# Patient Record
Sex: FEMALE | Race: WHITE | Employment: UNEMPLOYED | URBAN - METROPOLITAN AREA
[De-identification: names, ages, dates, MRNs, and addresses within clinical notes are randomized per-mention and may not be internally consistent; named-entity substitution may affect disease eponyms.]

---

## 2019-01-25 ENCOUNTER — APPOINTMENT (OUTPATIENT)
Dept: ULTRASOUND IMAGING | Age: 19
End: 2019-01-25
Payer: COMMERCIAL

## 2019-01-25 ENCOUNTER — HOSPITAL ENCOUNTER (EMERGENCY)
Age: 19
Discharge: HOME | End: 2019-01-25
Attending: EMERGENCY MEDICINE
Payer: COMMERCIAL

## 2019-01-25 VITALS
DIASTOLIC BLOOD PRESSURE: 71 MMHG | WEIGHT: 110 LBS | RESPIRATION RATE: 18 BRPM | OXYGEN SATURATION: 100 % | SYSTOLIC BLOOD PRESSURE: 109 MMHG | BODY MASS INDEX: 18.78 KG/M2 | HEART RATE: 95 BPM | TEMPERATURE: 98.3 F | HEIGHT: 64 IN

## 2019-01-25 DIAGNOSIS — O26.891 ABDOMINAL PAIN DURING PREGNANCY IN FIRST TRIMESTER: Primary | ICD-10-CM

## 2019-01-25 DIAGNOSIS — R10.9 ABDOMINAL PAIN DURING PREGNANCY IN FIRST TRIMESTER: Primary | ICD-10-CM

## 2019-01-25 DIAGNOSIS — R93.89 ABNORMAL ULTRASOUND OF PELVIS: ICD-10-CM

## 2019-01-25 LAB
ALBUMIN SERPL-MCNC: 4.2 GM/DL (ref 3.4–5)
ALP BLD-CCNC: 60 IU/L (ref 40–129)
ALT SERPL-CCNC: 7 U/L (ref 10–40)
AMORPHOUS: ABNORMAL /HPF
ANION GAP SERPL CALCULATED.3IONS-SCNC: 11 MMOL/L (ref 4–16)
AST SERPL-CCNC: 14 IU/L (ref 15–37)
BACTERIA: ABNORMAL /HPF
BASOPHILS ABSOLUTE: 0 K/CU MM
BASOPHILS RELATIVE PERCENT: 0.4 % (ref 0–1)
BILIRUB SERPL-MCNC: 0.3 MG/DL (ref 0–1)
BILIRUBIN URINE: NEGATIVE MG/DL
BLOOD, URINE: NEGATIVE
BUN BLDV-MCNC: 11 MG/DL (ref 6–23)
CALCIUM SERPL-MCNC: 9.1 MG/DL (ref 8.3–10.6)
CHLORIDE BLD-SCNC: 101 MMOL/L (ref 99–110)
CLARITY: ABNORMAL
CO2: 24 MMOL/L (ref 21–32)
COLOR: ABNORMAL
CREAT SERPL-MCNC: 0.8 MG/DL (ref 0.6–1.1)
DIFFERENTIAL TYPE: ABNORMAL
EOSINOPHILS ABSOLUTE: 0.1 K/CU MM
EOSINOPHILS RELATIVE PERCENT: 0.8 % (ref 0–3)
GFR AFRICAN AMERICAN: >60 ML/MIN/1.73M2
GFR NON-AFRICAN AMERICAN: >60 ML/MIN/1.73M2
GLUCOSE BLD-MCNC: 90 MG/DL (ref 70–99)
GLUCOSE, URINE: NEGATIVE MG/DL
GONADOTROPIN, CHORIONIC (HCG) QUANT: 2139 UIU/ML
HCT VFR BLD CALC: 35.7 % (ref 37–47)
HEMOGLOBIN: 11.3 GM/DL (ref 12.5–16)
IMMATURE NEUTROPHIL %: 0.1 % (ref 0–0.43)
KETONES, URINE: ABNORMAL MG/DL
LEUKOCYTE ESTERASE, URINE: NEGATIVE
LYMPHOCYTES ABSOLUTE: 2 K/CU MM
LYMPHOCYTES RELATIVE PERCENT: 27.5 % (ref 24–44)
MCH RBC QN AUTO: 27.7 PG (ref 27–31)
MCHC RBC AUTO-ENTMCNC: 31.7 % (ref 32–36)
MCV RBC AUTO: 87.5 FL (ref 78–100)
MONOCYTES ABSOLUTE: 0.6 K/CU MM
MONOCYTES RELATIVE PERCENT: 7.7 % (ref 0–4)
NITRITE URINE, QUANTITATIVE: NEGATIVE
NUCLEATED RBC %: 0 %
PDW BLD-RTO: 14 % (ref 11.7–14.9)
PH, URINE: 7 (ref 5–8)
PLATELET # BLD: 274 K/CU MM (ref 140–440)
PMV BLD AUTO: 9.8 FL (ref 7.5–11.1)
POTASSIUM SERPL-SCNC: 4.2 MMOL/L (ref 3.5–5.1)
PROTEIN UA: NEGATIVE MG/DL
RBC # BLD: 4.08 M/CU MM (ref 4.2–5.4)
RBC URINE: ABNORMAL /HPF (ref 0–6)
SEGMENTED NEUTROPHILS ABSOLUTE COUNT: 4.5 K/CU MM
SEGMENTED NEUTROPHILS RELATIVE PERCENT: 63.5 % (ref 36–66)
SODIUM BLD-SCNC: 136 MMOL/L (ref 135–145)
SPECIFIC GRAVITY UA: 1.01 (ref 1–1.03)
SPERM: ABNORMAL /HFP
SQUAMOUS EPITHELIAL: 5 /HPF
TOTAL IMMATURE NEUTOROPHIL: 0.01 K/CU MM
TOTAL NUCLEATED RBC: 0 K/CU MM
TOTAL PROTEIN: 7.2 GM/DL (ref 6.4–8.2)
TRANSITIONAL EPITHELIAL: <1 /HPF
TRICHOMONAS: ABNORMAL /HPF
UROBILINOGEN, URINE: NORMAL MG/DL (ref 0.2–1)
WBC # BLD: 7.1 K/CU MM (ref 4–10.5)
WBC UA: 1 /HPF (ref 0–5)

## 2019-01-25 PROCEDURE — 99284 EMERGENCY DEPT VISIT MOD MDM: CPT

## 2019-01-25 PROCEDURE — 36415 COLL VENOUS BLD VENIPUNCTURE: CPT

## 2019-01-25 PROCEDURE — 85025 COMPLETE CBC W/AUTO DIFF WBC: CPT

## 2019-01-25 PROCEDURE — 84702 CHORIONIC GONADOTROPIN TEST: CPT

## 2019-01-25 PROCEDURE — 80053 COMPREHEN METABOLIC PANEL: CPT

## 2019-01-25 PROCEDURE — 81001 URINALYSIS AUTO W/SCOPE: CPT

## 2019-01-25 PROCEDURE — 86901 BLOOD TYPING SEROLOGIC RH(D): CPT

## 2019-01-25 PROCEDURE — 93975 VASCULAR STUDY: CPT

## 2019-01-25 PROCEDURE — 76817 TRANSVAGINAL US OBSTETRIC: CPT

## 2019-01-25 PROCEDURE — 86900 BLOOD TYPING SEROLOGIC ABO: CPT

## 2019-01-25 RX ORDER — ESCITALOPRAM OXALATE 10 MG/1
10 TABLET ORAL DAILY
Status: ON HOLD | COMMUNITY
End: 2019-08-20

## 2019-01-25 ASSESSMENT — PAIN DESCRIPTION - LOCATION: LOCATION: ABDOMEN

## 2019-01-25 ASSESSMENT — PAIN DESCRIPTION - ORIENTATION: ORIENTATION: LOWER

## 2019-01-25 ASSESSMENT — PAIN SCALES - GENERAL: PAINLEVEL_OUTOF10: 1

## 2019-01-25 ASSESSMENT — PAIN DESCRIPTION - PAIN TYPE: TYPE: ACUTE PAIN

## 2019-01-25 NOTE — ED PROVIDER NOTES
eMERGENCY dEPARTMENT eNCOUnter      PCP: Ashley Elliott    CHIEF COMPLAINT    Chief Complaint   Patient presents with    Threatened Miscarriage     6 weeks pregnant and reports passing tissue       HPI    Deven Malone is a 19 y.o.  female who presents At estimated 6 weeks gestation based on last menstrual cycle in the middle of December with passing of what she believes is some tissue last night. This was an isolated issue and she denies any further passing of clots blood. Denies urinary symptoms. She reports baseline abdominal cramping last 3 weeks without any changes in this associated with last night's episode or today. No dizziness presyncope syncope chest pain shortness or other systemic symptoms. Has not yet followed up with OB for this pregnancy but is currently taking prenatals. REVIEW OF SYSTEMS    Constitutional:  Denies fever, chills, weight loss or weakness   HENT:  Denies sore throat or ear pain   Cardiovascular:  Denies chest pain, palpitations   Respiratory:  Denies cough or shortness of breath    GI:  Denies abdominal pain, nausea, vomiting, or diarrhea  :  Denies any urinary symptoms.  See hpi  Musculoskeletal:  Denies back pain,   Skin:  Denies rash  Neurologic:  Denies headache, focal weakness or sensory changes   Endocrine:  Denies polyuria or polydypsia   Lymphatic:  Denies swollen glands     All other review of systems are negative  See HPI and nursing notes for additional information     PAST MEDICAL AND SURGICAL HISTORY    Past Medical History:   Diagnosis Date    Anxiety     Depression      Past Surgical History:   Procedure Laterality Date    SMALL INTESTINE SURGERY         CURRENT MEDICATIONS    Current Outpatient Rx   Medication Sig Dispense Refill    BuPROPion HCl (WELLBUTRIN PO) Take by mouth      escitalopram (LEXAPRO) 10 MG tablet Take 10 mg by mouth daily      Prenatal MV-Min-Fe Fum-FA-DHA (PRENATAL 1 PO) Take by mouth         ALLERGIES    No Known Allergies    SOCIAL AND FAMILY HISTORY    Social History     Social History    Marital status: Single     Spouse name: N/A    Number of children: N/A    Years of education: N/A     Social History Main Topics    Smoking status: Never Smoker    Smokeless tobacco: Never Used    Alcohol use No    Drug use: No    Sexual activity: Not Currently     Other Topics Concern    None     Social History Narrative    None     History reviewed. No pertinent family history. PHYSICAL EXAM    VITAL SIGNS: /71   Pulse 95   Temp 98.3 °F (36.8 °C) (Oral)   Resp 18   Ht 5' 4\" (1.626 m)   Wt 110 lb (49.9 kg)   LMP 03/05/2018   SpO2 100%   BMI 18.88 kg/m²    Constitutional:  Well developed, Well nourished  HENT:  Normocephalic, Atraumatic, PERRL. EOMI. Sclera clear. Conjunctiva normal, No discharge. Neck/Lymphatics: supple, no JVD, no swollen nodes  Cardiovascular:  Normal heart rate, Normal rhythm, No murmurs  Respiratory:  Nonlabored breathing. Normal breath sounds, No wheezing  Abdomen: Bowel sounds normal, Soft, No tenderness, no masses. benign  Musculoskeletal: No edema, No tenderness, No cyanosis  Integument:  Warm, Dry  Neurologic:  Alert & oriented , No focal deficits noted. Cranial nerves II through XII grossly intact.    Psychiatric:  Affect normal, Mood normal.       Labs:  Results for orders placed or performed during the hospital encounter of 01/25/19   CBC Auto Differential   Result Value Ref Range    WBC 7.1 4.0 - 10.5 K/CU MM    RBC 4.08 (L) 4.2 - 5.4 M/CU MM    Hemoglobin 11.3 (L) 12.5 - 16.0 GM/DL    Hematocrit 35.7 (L) 37 - 47 %    MCV 87.5 78 - 100 FL    MCH 27.7 27 - 31 PG    MCHC 31.7 (L) 32.0 - 36.0 %    RDW 14.0 11.7 - 14.9 %    Platelets 453 065 - 716 K/CU MM    MPV 9.8 7.5 - 11.1 FL    Differential Type AUTOMATED DIFFERENTIAL     Segs Relative 63.5 36 - 66 %    Lymphocytes % 27.5 24 - 44 %    Monocytes % 7.7 (H) 0 - 4 %    Eosinophils % 0.8 0 - 3 %    Basophils % 0.4 0 - 1 % endometrium of uncertain etiology.  A very early gestational  sac cannot be excluded. There are 2 right adnexal/ovarian lesions.  A complex lesion measuring up to  2.5 cm may represent a collapsing cyst.  The 2nd lesion appears cystic with  mildly thick wall.  In light of the history of a positive pregnancy test, an  ectopic pregnancy cannot be entirely excluded. Follow-up serial beta hCGs and if clinically indicated, follow-up ultrasound  would be helpful. Narrative:    EXAMINATION:  FIRST TRIMESTER OBSTETRIC ULTRASOUND; DOPPLER EVALUATION OF THE PELVIS    1/25/2019    TECHNIQUE:  Transvaginal first trimester obstetric pelvic ultrasound was performed with  color Doppler flow evaluation.; DOPPLER ULTRASOUND OF THE PELVIS    COMPARISON:  None    HISTORY:  ORDERING SYSTEM PROVIDED HISTORY: passed tissue yesterday in pregnancy  TECHNOLOGIST PROVIDED HISTORY:  Ordering Physician Provided Reason for Exam: passed tissue during pregnancy  Acuity: Acute  Type of Exam: Initial  Additional signs and symptoms: nk  Relevant Medical/Surgical History: nk; ORDERING SYSTEM PROVIDED HISTORY:    FINDINGS:  Uterus: 9.3 cm x 4.8 cm x 4.4 cm    Gestational Sac(s):  There is no evidence of an intrauterine gestational sac. Endometrial stripe measured 13 mm.  There is a 2-3 mm cystic lesion in the  endometrium of uncertain etiology. Right ovary: 3.4 cm x 2.2 cm x 2.4 cm.  There is a complex irregularly shaped  lesion in the right adnexa that is likely of right ovarian origin measuring  2.48 cm x 1.99 cm.  In addition, there is a thick walled cyst measuring 1.5  cm x 1.85 cm.  There was normal Doppler flow in the right ovary. Left ovary: 2.2 cm x 1.5 cm x 1.3 cm with normal Doppler flow. Free fluid:  There is a small amount of free fluid in the cul-de-sac.                    US OB TRANSVAGINAL (Final result)   Result time 01/25/19 19:27:27   [facility] [place] [facility]   Final result by Ira Munoz MD (01/25/19 19:27:27)                Impression:    There is no definite intrauterine gestational sac.  There is a 2-3 mm cystic  lesion in the endometrium of uncertain etiology.  A very early gestational  sac cannot be excluded. There are 2 right adnexal/ovarian lesions.  A complex lesion measuring up to  2.5 cm may represent a collapsing cyst.  The 2nd lesion appears cystic with  mildly thick wall.  In light of the history of a positive pregnancy test, an  ectopic pregnancy cannot be entirely excluded. Follow-up serial beta hCGs and if clinically indicated, follow-up ultrasound  would be helpful. Narrative:    EXAMINATION:  FIRST TRIMESTER OBSTETRIC ULTRASOUND; DOPPLER EVALUATION OF THE PELVIS    1/25/2019    TECHNIQUE:  Transvaginal first trimester obstetric pelvic ultrasound was performed with  color Doppler flow evaluation.; DOPPLER ULTRASOUND OF THE PELVIS    COMPARISON:  None    HISTORY:  ORDERING SYSTEM PROVIDED HISTORY: passed tissue yesterday in pregnancy  TECHNOLOGIST PROVIDED HISTORY:  Ordering Physician Provided Reason for Exam: passed tissue during pregnancy  Acuity: Acute  Type of Exam: Initial  Additional signs and symptoms: nk  Relevant Medical/Surgical History: nk; ORDERING SYSTEM PROVIDED HISTORY:    FINDINGS:  Uterus: 9.3 cm x 4.8 cm x 4.4 cm    Gestational Sac(s):  There is no evidence of an intrauterine gestational sac. Endometrial stripe measured 13 mm.  There is a 2-3 mm cystic lesion in the  endometrium of uncertain etiology. Right ovary: 3.4 cm x 2.2 cm x 2.4 cm.  There is a complex irregularly shaped  lesion in the right adnexa that is likely of right ovarian origin measuring  2.48 cm x 1.99 cm.  In addition, there is a thick walled cyst measuring 1.5  cm x 1.85 cm.  There was normal Doppler flow in the right ovary. Left ovary: 2.2 cm x 1.5 cm x 1.3 cm with normal Doppler flow. Free fluid:  There is a small amount of as well as words and phrases that may be inappropriate. If there are any questions or concerns please feel free to contact the dictating provider for clarification.         Matthew Bess PA-C  01/26/19 3876

## 2019-01-25 NOTE — ED TRIAGE NOTES
Patient to ED with complaints of lower abdominal cramping and vaginal discharge patient reports has \"tissue. \" 6 weeks pregnant. Denies vaginal bleeding. Patient concerned for possible miscarriage.

## 2019-01-25 NOTE — ED NOTES
[place] and type and screen drawn on patient      [facility]  01/25/19 [id number]

## 2019-01-26 NOTE — ED PROVIDER NOTES
Return to the ED for worsening symptoms. All diagnostic, treatment, and disposition decisions were made by myself in conjunction with the advanced practice provider. For all further details of the patient's emergency department visit, please see the advanced practice provider's documentation. Comment: Please note this report has been produced using speech recognition software and may contain errors related to that system including errors in grammar, punctuation, and spelling, as well as words and phrases that may be inappropriate. If there are any questions or concerns please feel free to contact the dictating provider for clarification.         Nathan Correia MD  02/02/19 1381

## 2019-01-27 ENCOUNTER — HOSPITAL ENCOUNTER (EMERGENCY)
Age: 19
Discharge: HOME | End: 2019-01-27
Payer: COMMERCIAL

## 2019-01-27 VITALS
DIASTOLIC BLOOD PRESSURE: 71 MMHG | WEIGHT: 110 LBS | RESPIRATION RATE: 16 BRPM | BODY MASS INDEX: 18.78 KG/M2 | SYSTOLIC BLOOD PRESSURE: 100 MMHG | TEMPERATURE: 98.7 F | OXYGEN SATURATION: 99 % | HEIGHT: 64 IN | HEART RATE: 76 BPM

## 2019-01-27 DIAGNOSIS — Z34.91 FIRST TRIMESTER PREGNANCY: Primary | ICD-10-CM

## 2019-01-27 LAB — GONADOTROPIN, CHORIONIC (HCG) QUANT: 4284 UIU/ML

## 2019-01-27 PROCEDURE — 84702 CHORIONIC GONADOTROPIN TEST: CPT

## 2019-01-27 PROCEDURE — 36415 COLL VENOUS BLD VENIPUNCTURE: CPT

## 2019-01-27 PROCEDURE — 99282 EMERGENCY DEPT VISIT SF MDM: CPT

## 2019-01-27 ASSESSMENT — PAIN DESCRIPTION - PAIN TYPE: TYPE: ACUTE PAIN

## 2019-01-27 ASSESSMENT — PAIN SCALES - GENERAL: PAINLEVEL_OUTOF10: 6

## 2019-01-27 ASSESSMENT — PAIN DESCRIPTION - LOCATION: LOCATION: ABDOMEN

## 2019-02-27 ENCOUNTER — HOSPITAL ENCOUNTER (EMERGENCY)
Age: 19
Discharge: HOME | End: 2019-02-27
Payer: COMMERCIAL

## 2019-02-27 VITALS
RESPIRATION RATE: 16 BRPM | SYSTOLIC BLOOD PRESSURE: 115 MMHG | TEMPERATURE: 98.2 F | BODY MASS INDEX: 18.78 KG/M2 | OXYGEN SATURATION: 99 % | HEIGHT: 64 IN | WEIGHT: 110 LBS | HEART RATE: 98 BPM | DIASTOLIC BLOOD PRESSURE: 80 MMHG

## 2019-02-27 DIAGNOSIS — R11.2 NON-INTRACTABLE VOMITING WITH NAUSEA, UNSPECIFIED VOMITING TYPE: Primary | ICD-10-CM

## 2019-02-27 LAB
BACTERIA: NEGATIVE /HPF
BILIRUBIN URINE: NEGATIVE MG/DL
BLOOD, URINE: ABNORMAL
CLARITY: CLEAR
COLOR: YELLOW
GLUCOSE, URINE: NEGATIVE MG/DL
GONADOTROPIN, CHORIONIC (HCG) QUANT: NORMAL UIU/ML
KETONES, URINE: ABNORMAL MG/DL
LEUKOCYTE ESTERASE, URINE: NEGATIVE
MUCUS: ABNORMAL HPF
NITRITE URINE, QUANTITATIVE: NEGATIVE
PH, URINE: 5 (ref 5–8)
PROTEIN UA: 30 MG/DL
RAPID INFLUENZA  B AGN: NEGATIVE
RAPID INFLUENZA A AGN: NEGATIVE
RBC URINE: 10 /HPF (ref 0–6)
SPECIFIC GRAVITY UA: 1.03 (ref 1–1.03)
SQUAMOUS EPITHELIAL: 14 /HPF
TRICHOMONAS: ABNORMAL /HPF
UROBILINOGEN, URINE: 2 MG/DL (ref 0.2–1)
WBC UA: 2 /HPF (ref 0–5)

## 2019-02-27 PROCEDURE — 87804 INFLUENZA ASSAY W/OPTIC: CPT

## 2019-02-27 PROCEDURE — 84702 CHORIONIC GONADOTROPIN TEST: CPT

## 2019-02-27 PROCEDURE — 2580000003 HC RX 258: Performed by: NURSE PRACTITIONER

## 2019-02-27 PROCEDURE — 6360000002 HC RX W HCPCS: Performed by: NURSE PRACTITIONER

## 2019-02-27 PROCEDURE — 99283 EMERGENCY DEPT VISIT LOW MDM: CPT

## 2019-02-27 PROCEDURE — 81001 URINALYSIS AUTO W/SCOPE: CPT

## 2019-02-27 PROCEDURE — 96374 THER/PROPH/DIAG INJ IV PUSH: CPT

## 2019-02-27 RX ORDER — METOCLOPRAMIDE HYDROCHLORIDE 5 MG/ML
10 INJECTION INTRAMUSCULAR; INTRAVENOUS ONCE
Status: COMPLETED | OUTPATIENT
Start: 2019-02-27 | End: 2019-02-27

## 2019-02-27 RX ORDER — 0.9 % SODIUM CHLORIDE 0.9 %
1000 INTRAVENOUS SOLUTION INTRAVENOUS ONCE
Status: COMPLETED | OUTPATIENT
Start: 2019-02-27 | End: 2019-02-27

## 2019-02-27 RX ADMIN — SODIUM CHLORIDE 1000 ML: 9 INJECTION, SOLUTION INTRAVENOUS at 16:23

## 2019-02-27 RX ADMIN — METOCLOPRAMIDE 10 MG: 5 INJECTION, SOLUTION INTRAMUSCULAR; INTRAVENOUS at 16:23

## 2019-02-27 ASSESSMENT — PAIN DESCRIPTION - LOCATION: LOCATION: ABDOMEN

## 2019-02-27 ASSESSMENT — PAIN DESCRIPTION - PAIN TYPE: TYPE: ACUTE PAIN

## 2019-02-27 ASSESSMENT — PAIN DESCRIPTION - DESCRIPTORS: DESCRIPTORS: CRAMPING

## 2019-02-27 ASSESSMENT — PAIN SCALES - GENERAL: PAINLEVEL_OUTOF10: 4

## 2019-03-27 LAB
ABO, EXTERNAL RESULT: NORMAL
C. TRACHOMATIS, EXTERNAL RESULT: NEGATIVE
HEP B, EXTERNAL RESULT: NEGATIVE
HIV, EXTERNAL RESULT: NON REACTIVE
N. GONORRHOEAE, EXTERNAL RESULT: NEGATIVE
RH FACTOR, EXTERNAL RESULT: POSITIVE
RPR, EXTERNAL RESULT: NON REACTIVE
RUBELLA TITER, EXTERNAL RESULT: NORMAL

## 2019-08-20 ENCOUNTER — HOSPITAL ENCOUNTER (OUTPATIENT)
Age: 19
Discharge: HOME | End: 2019-08-20
Attending: OBSTETRICS & GYNECOLOGY | Admitting: OBSTETRICS & GYNECOLOGY
Payer: COMMERCIAL

## 2019-08-20 VITALS
WEIGHT: 135 LBS | SYSTOLIC BLOOD PRESSURE: 110 MMHG | DIASTOLIC BLOOD PRESSURE: 66 MMHG | HEART RATE: 85 BPM | HEIGHT: 64 IN | BODY MASS INDEX: 23.05 KG/M2 | TEMPERATURE: 98.3 F | RESPIRATION RATE: 16 BRPM

## 2019-08-20 PROBLEM — O26.90 PREGNANCY RELATED CONDITION: Status: ACTIVE | Noted: 2019-08-20

## 2019-08-20 LAB
BACTERIA: ABNORMAL /HPF
BILIRUBIN URINE: NEGATIVE MG/DL
BLOOD, URINE: NEGATIVE
CLARITY: ABNORMAL
COLOR: YELLOW
GLUCOSE, URINE: NEGATIVE MG/DL
KETONES, URINE: ABNORMAL MG/DL
LEUKOCYTE ESTERASE, URINE: ABNORMAL
MUCUS: ABNORMAL HPF
NITRITE URINE, QUANTITATIVE: NEGATIVE
PH, URINE: 7 (ref 5–8)
PROTEIN UA: 30 MG/DL
RBC URINE: 1 /HPF (ref 0–6)
SPECIFIC GRAVITY UA: 1.01 (ref 1–1.03)
SQUAMOUS EPITHELIAL: 57 /HPF
UROBILINOGEN, URINE: NORMAL MG/DL (ref 0.2–1)
WBC UA: 6 /HPF (ref 0–5)

## 2019-08-20 PROCEDURE — 96361 HYDRATE IV INFUSION ADD-ON: CPT

## 2019-08-20 PROCEDURE — 96374 THER/PROPH/DIAG INJ IV PUSH: CPT

## 2019-08-20 PROCEDURE — 2580000003 HC RX 258: Performed by: OBSTETRICS & GYNECOLOGY

## 2019-08-20 PROCEDURE — 96360 HYDRATION IV INFUSION INIT: CPT

## 2019-08-20 PROCEDURE — 6360000002 HC RX W HCPCS

## 2019-08-20 PROCEDURE — 81001 URINALYSIS AUTO W/SCOPE: CPT

## 2019-08-20 PROCEDURE — 2580000003 HC RX 258

## 2019-08-20 PROCEDURE — 99211 OFF/OP EST MAY X REQ PHY/QHP: CPT

## 2019-08-20 RX ORDER — SODIUM CHLORIDE, SODIUM LACTATE, POTASSIUM CHLORIDE, CALCIUM CHLORIDE 600; 310; 30; 20 MG/100ML; MG/100ML; MG/100ML; MG/100ML
1000 INJECTION, SOLUTION INTRAVENOUS CONTINUOUS
Status: DISCONTINUED | OUTPATIENT
Start: 2019-08-20 | End: 2019-08-20 | Stop reason: HOSPADM

## 2019-08-20 RX ORDER — ONDANSETRON 2 MG/ML
4 INJECTION INTRAMUSCULAR; INTRAVENOUS EVERY 6 HOURS PRN
Status: DISCONTINUED | OUTPATIENT
Start: 2019-08-20 | End: 2019-08-20 | Stop reason: HOSPADM

## 2019-08-20 RX ORDER — SODIUM CHLORIDE, SODIUM LACTATE, POTASSIUM CHLORIDE, CALCIUM CHLORIDE 600; 310; 30; 20 MG/100ML; MG/100ML; MG/100ML; MG/100ML
INJECTION, SOLUTION INTRAVENOUS
Status: COMPLETED
Start: 2019-08-20 | End: 2019-08-20

## 2019-08-20 RX ORDER — ONDANSETRON 2 MG/ML
INJECTION INTRAMUSCULAR; INTRAVENOUS
Status: COMPLETED
Start: 2019-08-20 | End: 2019-08-20

## 2019-08-20 RX ORDER — SODIUM CHLORIDE, SODIUM LACTATE, POTASSIUM CHLORIDE, CALCIUM CHLORIDE 600; 310; 30; 20 MG/100ML; MG/100ML; MG/100ML; MG/100ML
INJECTION, SOLUTION INTRAVENOUS ONCE
Status: COMPLETED | OUTPATIENT
Start: 2019-08-20 | End: 2019-08-20

## 2019-08-20 RX ORDER — SERTRALINE HYDROCHLORIDE 25 MG/1
25 TABLET, FILM COATED ORAL DAILY
COMMUNITY

## 2019-08-20 RX ADMIN — ONDANSETRON 4 MG: 2 INJECTION INTRAMUSCULAR; INTRAVENOUS at 12:09

## 2019-08-20 RX ADMIN — SODIUM CHLORIDE, POTASSIUM CHLORIDE, SODIUM LACTATE AND CALCIUM CHLORIDE 125 ML: 600; 310; 30; 20 INJECTION, SOLUTION INTRAVENOUS at 12:53

## 2019-08-20 RX ADMIN — SODIUM CHLORIDE, SODIUM LACTATE, POTASSIUM CHLORIDE, CALCIUM CHLORIDE 125 ML: 600; 310; 30; 20 INJECTION, SOLUTION INTRAVENOUS at 12:53

## 2019-08-20 RX ADMIN — SODIUM CHLORIDE, POTASSIUM CHLORIDE, SODIUM LACTATE AND CALCIUM CHLORIDE: 600; 310; 30; 20 INJECTION, SOLUTION INTRAVENOUS at 12:08

## 2019-08-27 LAB — GBS, EXTERNAL RESULT: NEGATIVE

## 2019-10-01 ENCOUNTER — HOSPITAL ENCOUNTER (INPATIENT)
Age: 19
LOS: 3 days | Discharge: HOME | End: 2019-10-04
Attending: OBSTETRICS & GYNECOLOGY | Admitting: OBSTETRICS & GYNECOLOGY
Payer: COMMERCIAL

## 2019-10-01 DIAGNOSIS — G89.18 POST-OP PAIN: Primary | ICD-10-CM

## 2019-10-01 PROBLEM — Z34.93 ENCOUNTER FOR PREGNANCY RELATED EXAMINATION IN THIRD TRIMESTER: Status: ACTIVE | Noted: 2019-10-01

## 2019-10-01 LAB
ABO/RH: NORMAL
AMPHETAMINES: NEGATIVE
ANTIBODY SCREEN: NEGATIVE
BARBITURATE SCREEN URINE: NEGATIVE
BENZODIAZEPINE SCREEN, URINE: NEGATIVE
CANNABINOID SCREEN URINE: NEGATIVE
COCAINE METABOLITE: NEGATIVE
HCT VFR BLD CALC: 35.6 % (ref 37–47)
HEMOGLOBIN: 10.8 GM/DL (ref 12.5–16)
MCH RBC QN AUTO: 26.7 PG (ref 27–31)
MCHC RBC AUTO-ENTMCNC: 30.3 % (ref 32–36)
MCV RBC AUTO: 88.1 FL (ref 78–100)
OPIATES, URINE: NEGATIVE
OXYCODONE: NORMAL
PDW BLD-RTO: 14.3 % (ref 11.7–14.9)
PHENCYCLIDINE, URINE: NEGATIVE
PLATELET # BLD: 274 K/CU MM (ref 140–440)
PMV BLD AUTO: 10.4 FL (ref 7.5–11.1)
RBC # BLD: 4.04 M/CU MM (ref 4.2–5.4)
WBC # BLD: 9.1 K/CU MM (ref 4–10.5)

## 2019-10-01 PROCEDURE — 1220000000 HC SEMI PRIVATE OB R&B

## 2019-10-01 PROCEDURE — 6360000002 HC RX W HCPCS: Performed by: OBSTETRICS & GYNECOLOGY

## 2019-10-01 PROCEDURE — 2580000003 HC RX 258: Performed by: OBSTETRICS & GYNECOLOGY

## 2019-10-01 PROCEDURE — 3E0P7VZ INTRODUCTION OF HORMONE INTO FEMALE REPRODUCTIVE, VIA NATURAL OR ARTIFICIAL OPENING: ICD-10-PCS | Performed by: OBSTETRICS & GYNECOLOGY

## 2019-10-01 PROCEDURE — 86901 BLOOD TYPING SEROLOGIC RH(D): CPT

## 2019-10-01 PROCEDURE — 86900 BLOOD TYPING SEROLOGIC ABO: CPT

## 2019-10-01 PROCEDURE — 86850 RBC ANTIBODY SCREEN: CPT

## 2019-10-01 PROCEDURE — 6370000000 HC RX 637 (ALT 250 FOR IP): Performed by: OBSTETRICS & GYNECOLOGY

## 2019-10-01 PROCEDURE — 80307 DRUG TEST PRSMV CHEM ANLYZR: CPT

## 2019-10-01 PROCEDURE — 85027 COMPLETE CBC AUTOMATED: CPT

## 2019-10-01 PROCEDURE — 3E033VJ INTRODUCTION OF OTHER HORMONE INTO PERIPHERAL VEIN, PERCUTANEOUS APPROACH: ICD-10-PCS | Performed by: OBSTETRICS & GYNECOLOGY

## 2019-10-01 RX ORDER — FENTANYL CITRATE 50 UG/ML
100 INJECTION, SOLUTION INTRAMUSCULAR; INTRAVENOUS
Status: DISCONTINUED | OUTPATIENT
Start: 2019-10-01 | End: 2019-10-02

## 2019-10-01 RX ORDER — DIPHENHYDRAMINE HCL 50 MG
50 CAPSULE ORAL NIGHTLY PRN
Status: DISCONTINUED | OUTPATIENT
Start: 2019-10-01 | End: 2019-10-03

## 2019-10-01 RX ORDER — SODIUM CHLORIDE, SODIUM LACTATE, POTASSIUM CHLORIDE, CALCIUM CHLORIDE 600; 310; 30; 20 MG/100ML; MG/100ML; MG/100ML; MG/100ML
INJECTION, SOLUTION INTRAVENOUS CONTINUOUS
Status: DISCONTINUED | OUTPATIENT
Start: 2019-10-01 | End: 2019-10-03

## 2019-10-01 RX ORDER — ONDANSETRON 2 MG/ML
4 INJECTION INTRAMUSCULAR; INTRAVENOUS EVERY 6 HOURS PRN
Status: DISCONTINUED | OUTPATIENT
Start: 2019-10-01 | End: 2019-10-02 | Stop reason: HOSPADM

## 2019-10-01 RX ADMIN — DINOPROSTONE 10 MG: 10 INSERT VAGINAL at 20:17

## 2019-10-01 RX ADMIN — DIPHENHYDRAMINE HYDROCHLORIDE 50 MG: 50 CAPSULE ORAL at 21:02

## 2019-10-01 RX ADMIN — Medication 1 MILLI-UNITS/MIN: at 08:48

## 2019-10-01 RX ADMIN — SODIUM CHLORIDE, POTASSIUM CHLORIDE, SODIUM LACTATE AND CALCIUM CHLORIDE: 600; 310; 30; 20 INJECTION, SOLUTION INTRAVENOUS at 16:27

## 2019-10-01 RX ADMIN — SODIUM CHLORIDE, POTASSIUM CHLORIDE, SODIUM LACTATE AND CALCIUM CHLORIDE: 600; 310; 30; 20 INJECTION, SOLUTION INTRAVENOUS at 08:34

## 2019-10-01 ASSESSMENT — PAIN SCALES - GENERAL
PAINLEVEL_OUTOF10: 0
PAINLEVEL_OUTOF10: 1
PAINLEVEL_OUTOF10: 1
PAINLEVEL_OUTOF10: 0

## 2019-10-01 ASSESSMENT — PAIN DESCRIPTION - PAIN TYPE: TYPE: ACUTE PAIN

## 2019-10-01 ASSESSMENT — PAIN - FUNCTIONAL ASSESSMENT
PAIN_FUNCTIONAL_ASSESSMENT: ACTIVITIES ARE NOT PREVENTED
PAIN_FUNCTIONAL_ASSESSMENT: ACTIVITIES ARE NOT PREVENTED

## 2019-10-01 ASSESSMENT — PAIN DESCRIPTION - FREQUENCY: FREQUENCY: INTERMITTENT

## 2019-10-01 ASSESSMENT — PAIN DESCRIPTION - DESCRIPTORS: DESCRIPTORS: CRAMPING

## 2019-10-01 ASSESSMENT — PAIN DESCRIPTION - LOCATION: LOCATION: ABDOMEN

## 2019-10-01 ASSESSMENT — PAIN DESCRIPTION - ORIENTATION: ORIENTATION: LOWER

## 2019-10-02 ENCOUNTER — ANESTHESIA (OUTPATIENT)
Dept: LABOR AND DELIVERY | Age: 19
End: 2019-10-02
Payer: COMMERCIAL

## 2019-10-02 ENCOUNTER — ANESTHESIA EVENT (OUTPATIENT)
Dept: LABOR AND DELIVERY | Age: 19
End: 2019-10-02
Payer: COMMERCIAL

## 2019-10-02 PROCEDURE — 7200000001 HC VAGINAL DELIVERY

## 2019-10-02 PROCEDURE — 2580000003 HC RX 258: Performed by: OBSTETRICS & GYNECOLOGY

## 2019-10-02 PROCEDURE — 6360000002 HC RX W HCPCS: Performed by: OBSTETRICS & GYNECOLOGY

## 2019-10-02 PROCEDURE — 0KQM0ZZ REPAIR PERINEUM MUSCLE, OPEN APPROACH: ICD-10-PCS | Performed by: OBSTETRICS & GYNECOLOGY

## 2019-10-02 PROCEDURE — 6360000002 HC RX W HCPCS: Performed by: ANESTHESIOLOGY

## 2019-10-02 PROCEDURE — 3700000025 EPIDURAL BLOCK: Performed by: ANESTHESIOLOGY

## 2019-10-02 PROCEDURE — 51702 INSERT TEMP BLADDER CATH: CPT

## 2019-10-02 PROCEDURE — 6360000002 HC RX W HCPCS: Performed by: NURSE ANESTHETIST, CERTIFIED REGISTERED

## 2019-10-02 PROCEDURE — 1220000000 HC SEMI PRIVATE OB R&B

## 2019-10-02 RX ORDER — ROPIVACAINE HYDROCHLORIDE 2 MG/ML
14 INJECTION, SOLUTION EPIDURAL; INFILTRATION; PERINEURAL CONTINUOUS
Status: DISCONTINUED | OUTPATIENT
Start: 2019-10-02 | End: 2019-10-03

## 2019-10-02 RX ORDER — IBUPROFEN 800 MG/1
800 TABLET ORAL EVERY 8 HOURS PRN
Status: DISCONTINUED | OUTPATIENT
Start: 2019-10-02 | End: 2019-10-04 | Stop reason: HOSPADM

## 2019-10-02 RX ORDER — ACETAMINOPHEN 325 MG/1
650 TABLET ORAL EVERY 4 HOURS PRN
Status: DISCONTINUED | OUTPATIENT
Start: 2019-10-02 | End: 2019-10-04 | Stop reason: HOSPADM

## 2019-10-02 RX ORDER — HYDROCODONE BITARTRATE AND ACETAMINOPHEN 5; 325 MG/1; MG/1
2 TABLET ORAL EVERY 4 HOURS PRN
Status: DISCONTINUED | OUTPATIENT
Start: 2019-10-02 | End: 2019-10-04 | Stop reason: HOSPADM

## 2019-10-02 RX ORDER — ROPIVACAINE HYDROCHLORIDE 2 MG/ML
INJECTION, SOLUTION EPIDURAL; INFILTRATION; PERINEURAL PRN
Status: DISCONTINUED | OUTPATIENT
Start: 2019-10-02 | End: 2019-10-02 | Stop reason: SDUPTHER

## 2019-10-02 RX ORDER — HYDROCODONE BITARTRATE AND ACETAMINOPHEN 5; 325 MG/1; MG/1
1 TABLET ORAL EVERY 4 HOURS PRN
Status: DISCONTINUED | OUTPATIENT
Start: 2019-10-02 | End: 2019-10-04 | Stop reason: HOSPADM

## 2019-10-02 RX ADMIN — SODIUM CHLORIDE, POTASSIUM CHLORIDE, SODIUM LACTATE AND CALCIUM CHLORIDE: 600; 310; 30; 20 INJECTION, SOLUTION INTRAVENOUS at 16:03

## 2019-10-02 RX ADMIN — FENTANYL CITRATE 100 MCG: 50 INJECTION INTRAMUSCULAR; INTRAVENOUS at 14:37

## 2019-10-02 RX ADMIN — Medication 250 MILLI-UNITS/MIN: at 19:39

## 2019-10-02 RX ADMIN — SODIUM CHLORIDE, POTASSIUM CHLORIDE, SODIUM LACTATE AND CALCIUM CHLORIDE: 600; 310; 30; 20 INJECTION, SOLUTION INTRAVENOUS at 02:14

## 2019-10-02 RX ADMIN — ROPIVACAINE HYDROCHLORIDE 14 ML/HR: 2 INJECTION, SOLUTION EPIDURAL; INFILTRATION at 15:05

## 2019-10-02 RX ADMIN — FENTANYL CITRATE 100 MCG: 50 INJECTION INTRAMUSCULAR; INTRAVENOUS at 13:21

## 2019-10-02 RX ADMIN — Medication 1 MILLI-UNITS/MIN: at 10:40

## 2019-10-02 RX ADMIN — ROPIVACAINE HYDROCHLORIDE 10 ML: 2 INJECTION, SOLUTION EPIDURAL; INFILTRATION at 15:05

## 2019-10-02 RX ADMIN — ONDANSETRON 4 MG: 2 INJECTION INTRAMUSCULAR; INTRAVENOUS at 13:26

## 2019-10-02 RX ADMIN — SODIUM CHLORIDE, POTASSIUM CHLORIDE, SODIUM LACTATE AND CALCIUM CHLORIDE: 600; 310; 30; 20 INJECTION, SOLUTION INTRAVENOUS at 12:03

## 2019-10-02 ASSESSMENT — PAIN SCALES - GENERAL
PAINLEVEL_OUTOF10: 2
PAINLEVEL_OUTOF10: 0
PAINLEVEL_OUTOF10: 7
PAINLEVEL_OUTOF10: 0
PAINLEVEL_OUTOF10: 0
PAINLEVEL_OUTOF10: 5
PAINLEVEL_OUTOF10: 3
PAINLEVEL_OUTOF10: 0

## 2019-10-02 ASSESSMENT — PAIN DESCRIPTION - PROGRESSION
CLINICAL_PROGRESSION: NOT CHANGED
CLINICAL_PROGRESSION: GRADUALLY WORSENING
CLINICAL_PROGRESSION: NOT CHANGED
CLINICAL_PROGRESSION: GRADUALLY WORSENING
CLINICAL_PROGRESSION: NOT CHANGED

## 2019-10-02 ASSESSMENT — PAIN DESCRIPTION - ORIENTATION
ORIENTATION: MID;LOWER
ORIENTATION: MID;LOWER

## 2019-10-02 ASSESSMENT — PAIN DESCRIPTION - PAIN TYPE
TYPE: ACUTE PAIN
TYPE: ACUTE PAIN

## 2019-10-02 ASSESSMENT — PAIN DESCRIPTION - LOCATION
LOCATION: PELVIS
LOCATION: PELVIS

## 2019-10-02 ASSESSMENT — PAIN DESCRIPTION - DESCRIPTORS
DESCRIPTORS: CRAMPING

## 2019-10-02 ASSESSMENT — PAIN DESCRIPTION - ONSET
ONSET: ON-GOING
ONSET: ON-GOING

## 2019-10-02 ASSESSMENT — PAIN DESCRIPTION - FREQUENCY
FREQUENCY: INTERMITTENT
FREQUENCY: INTERMITTENT

## 2019-10-03 PROCEDURE — 1220000000 HC SEMI PRIVATE OB R&B

## 2019-10-03 PROCEDURE — 6370000000 HC RX 637 (ALT 250 FOR IP): Performed by: OBSTETRICS & GYNECOLOGY

## 2019-10-03 RX ORDER — SODIUM CHLORIDE 0.9 % (FLUSH) 0.9 %
10 SYRINGE (ML) INJECTION EVERY 12 HOURS SCHEDULED
Status: DISCONTINUED | OUTPATIENT
Start: 2019-10-03 | End: 2019-10-04 | Stop reason: HOSPADM

## 2019-10-03 RX ORDER — SERTRALINE HYDROCHLORIDE 25 MG/1
25 TABLET, FILM COATED ORAL DAILY
Status: DISCONTINUED | OUTPATIENT
Start: 2019-10-03 | End: 2019-10-04 | Stop reason: HOSPADM

## 2019-10-03 RX ORDER — FERROUS SULFATE 325(65) MG
325 TABLET ORAL 2 TIMES DAILY WITH MEALS
Status: DISCONTINUED | OUTPATIENT
Start: 2019-10-03 | End: 2019-10-04 | Stop reason: HOSPADM

## 2019-10-03 RX ORDER — DOCUSATE SODIUM 100 MG/1
100 CAPSULE, LIQUID FILLED ORAL 2 TIMES DAILY
Status: DISCONTINUED | OUTPATIENT
Start: 2019-10-03 | End: 2019-10-04 | Stop reason: HOSPADM

## 2019-10-03 RX ORDER — METHYLERGONOVINE MALEATE 0.2 MG/ML
200 INJECTION INTRAVENOUS PRN
Status: DISCONTINUED | OUTPATIENT
Start: 2019-10-03 | End: 2019-10-04 | Stop reason: HOSPADM

## 2019-10-03 RX ORDER — LANOLIN 100 %
OINTMENT (GRAM) TOPICAL PRN
Status: DISCONTINUED | OUTPATIENT
Start: 2019-10-03 | End: 2019-10-04 | Stop reason: HOSPADM

## 2019-10-03 RX ORDER — SIMETHICONE 80 MG
80 TABLET,CHEWABLE ORAL EVERY 6 HOURS PRN
Status: DISCONTINUED | OUTPATIENT
Start: 2019-10-03 | End: 2019-10-04 | Stop reason: HOSPADM

## 2019-10-03 RX ORDER — ONDANSETRON 4 MG/1
8 TABLET, ORALLY DISINTEGRATING ORAL EVERY 8 HOURS PRN
Status: DISCONTINUED | OUTPATIENT
Start: 2019-10-03 | End: 2019-10-04 | Stop reason: HOSPADM

## 2019-10-03 RX ORDER — SODIUM CHLORIDE 0.9 % (FLUSH) 0.9 %
10 SYRINGE (ML) INJECTION PRN
Status: DISCONTINUED | OUTPATIENT
Start: 2019-10-03 | End: 2019-10-04 | Stop reason: HOSPADM

## 2019-10-03 RX ADMIN — IBUPROFEN 800 MG: 800 TABLET, FILM COATED ORAL at 09:37

## 2019-10-03 RX ADMIN — DOCUSATE SODIUM 100 MG: 100 CAPSULE, LIQUID FILLED ORAL at 21:03

## 2019-10-03 RX ADMIN — HYDROCODONE BITARTRATE AND ACETAMINOPHEN 2 TABLET: 5; 325 TABLET ORAL at 21:03

## 2019-10-03 RX ADMIN — HYDROCODONE BITARTRATE AND ACETAMINOPHEN 2 TABLET: 5; 325 TABLET ORAL at 16:02

## 2019-10-03 RX ADMIN — HYDROCODONE BITARTRATE AND ACETAMINOPHEN 1 TABLET: 5; 325 TABLET ORAL at 02:48

## 2019-10-03 RX ADMIN — DOCUSATE SODIUM 100 MG: 100 CAPSULE, LIQUID FILLED ORAL at 09:37

## 2019-10-03 RX ADMIN — HYDROCODONE BITARTRATE AND ACETAMINOPHEN 1 TABLET: 5; 325 TABLET ORAL at 11:41

## 2019-10-03 RX ADMIN — IBUPROFEN 800 MG: 800 TABLET, FILM COATED ORAL at 18:26

## 2019-10-03 ASSESSMENT — PAIN SCALES - GENERAL
PAINLEVEL_OUTOF10: 7
PAINLEVEL_OUTOF10: 7
PAINLEVEL_OUTOF10: 4
PAINLEVEL_OUTOF10: 3
PAINLEVEL_OUTOF10: 3
PAINLEVEL_OUTOF10: 0
PAINLEVEL_OUTOF10: 4

## 2019-10-04 VITALS
OXYGEN SATURATION: 100 % | TEMPERATURE: 98.4 F | WEIGHT: 140 LBS | BODY MASS INDEX: 23.9 KG/M2 | SYSTOLIC BLOOD PRESSURE: 104 MMHG | RESPIRATION RATE: 18 BRPM | HEIGHT: 64 IN | HEART RATE: 67 BPM | DIASTOLIC BLOOD PRESSURE: 66 MMHG

## 2019-10-04 PROCEDURE — 6370000000 HC RX 637 (ALT 250 FOR IP): Performed by: OBSTETRICS & GYNECOLOGY

## 2019-10-04 RX ORDER — IBUPROFEN 800 MG/1
800 TABLET ORAL EVERY 8 HOURS PRN
Qty: 120 TABLET | Refills: 3 | Status: SHIPPED | OUTPATIENT
Start: 2019-10-04

## 2019-10-04 RX ORDER — HYDROCODONE BITARTRATE AND ACETAMINOPHEN 5; 325 MG/1; MG/1
1 TABLET ORAL EVERY 6 HOURS PRN
Qty: 8 TABLET | Refills: 0 | Status: SHIPPED | OUTPATIENT
Start: 2019-10-04 | End: 2019-10-09

## 2019-10-04 RX ADMIN — IBUPROFEN 800 MG: 800 TABLET, FILM COATED ORAL at 10:01

## 2019-10-04 RX ADMIN — HYDROCODONE BITARTRATE AND ACETAMINOPHEN 1 TABLET: 5; 325 TABLET ORAL at 12:23

## 2019-10-04 RX ADMIN — DOCUSATE SODIUM 100 MG: 100 CAPSULE, LIQUID FILLED ORAL at 10:02

## 2019-10-04 RX ADMIN — HYDROCODONE BITARTRATE AND ACETAMINOPHEN 2 TABLET: 5; 325 TABLET ORAL at 06:38

## 2019-10-04 ASSESSMENT — PAIN DESCRIPTION - PROGRESSION
CLINICAL_PROGRESSION: GRADUALLY WORSENING
CLINICAL_PROGRESSION: GRADUALLY IMPROVING
CLINICAL_PROGRESSION: GRADUALLY WORSENING

## 2019-10-04 ASSESSMENT — PAIN SCALES - GENERAL
PAINLEVEL_OUTOF10: 7
PAINLEVEL_OUTOF10: 5
PAINLEVEL_OUTOF10: 3
PAINLEVEL_OUTOF10: 2

## 2019-10-04 ASSESSMENT — PAIN - FUNCTIONAL ASSESSMENT: PAIN_FUNCTIONAL_ASSESSMENT: ACTIVITIES ARE NOT PREVENTED

## 2019-10-04 ASSESSMENT — PAIN DESCRIPTION - FREQUENCY: FREQUENCY: CONTINUOUS

## 2019-10-04 ASSESSMENT — PAIN DESCRIPTION - ONSET: ONSET: GRADUAL

## 2019-10-04 ASSESSMENT — PAIN DESCRIPTION - DESCRIPTORS: DESCRIPTORS: DISCOMFORT;SORE

## 2019-10-04 ASSESSMENT — PAIN DESCRIPTION - LOCATION: LOCATION: PERINEUM;VAGINA

## 2019-10-04 ASSESSMENT — PAIN DESCRIPTION - PAIN TYPE: TYPE: ACUTE PAIN

## 2021-06-08 ENCOUNTER — HOSPITAL ENCOUNTER (EMERGENCY)
Age: 21
Discharge: HOME | End: 2021-06-08
Attending: EMERGENCY MEDICINE
Payer: COMMERCIAL

## 2021-06-08 VITALS
OXYGEN SATURATION: 100 % | SYSTOLIC BLOOD PRESSURE: 120 MMHG | BODY MASS INDEX: 20.94 KG/M2 | RESPIRATION RATE: 16 BRPM | TEMPERATURE: 99 F | HEART RATE: 82 BPM | WEIGHT: 122 LBS | DIASTOLIC BLOOD PRESSURE: 80 MMHG

## 2021-06-08 DIAGNOSIS — O21.0 MILD HYPEREMESIS GRAVIDARUM, ANTEPARTUM: Primary | ICD-10-CM

## 2021-06-08 LAB
ANION GAP SERPL CALCULATED.3IONS-SCNC: 3 MMOL/L (ref 4–16)
BUN BLDV-MCNC: 8 MG/DL (ref 6–23)
CALCIUM SERPL-MCNC: 9.4 MG/DL (ref 8.3–10.6)
CHLORIDE BLD-SCNC: 102 MMOL/L (ref 99–110)
CO2: 30 MMOL/L (ref 21–32)
CREAT SERPL-MCNC: 0.6 MG/DL (ref 0.6–1.1)
GFR AFRICAN AMERICAN: >60 ML/MIN/1.73M2
GFR NON-AFRICAN AMERICAN: >60 ML/MIN/1.73M2
GLUCOSE BLD-MCNC: 98 MG/DL (ref 70–99)
POTASSIUM SERPL-SCNC: 4 MMOL/L (ref 3.5–5.1)
SODIUM BLD-SCNC: 135 MMOL/L (ref 135–145)

## 2021-06-08 PROCEDURE — 99284 EMERGENCY DEPT VISIT MOD MDM: CPT

## 2021-06-08 PROCEDURE — 80048 BASIC METABOLIC PNL TOTAL CA: CPT

## 2021-06-08 PROCEDURE — 6360000002 HC RX W HCPCS: Performed by: EMERGENCY MEDICINE

## 2021-06-08 PROCEDURE — 96375 TX/PRO/DX INJ NEW DRUG ADDON: CPT

## 2021-06-08 PROCEDURE — 96374 THER/PROPH/DIAG INJ IV PUSH: CPT

## 2021-06-08 PROCEDURE — 2580000003 HC RX 258: Performed by: EMERGENCY MEDICINE

## 2021-06-08 RX ORDER — METOCLOPRAMIDE 10 MG/1
10 TABLET ORAL 4 TIMES DAILY
Qty: 30 TABLET | Refills: 1 | Status: SHIPPED | OUTPATIENT
Start: 2021-06-08

## 2021-06-08 RX ORDER — PROMETHAZINE HYDROCHLORIDE 25 MG/1
25 TABLET ORAL EVERY 6 HOURS PRN
COMMUNITY

## 2021-06-08 RX ORDER — METOCLOPRAMIDE HYDROCHLORIDE 5 MG/ML
10 INJECTION INTRAMUSCULAR; INTRAVENOUS ONCE
Status: COMPLETED | OUTPATIENT
Start: 2021-06-08 | End: 2021-06-08

## 2021-06-08 RX ORDER — SODIUM CHLORIDE, SODIUM LACTATE, POTASSIUM CHLORIDE, CALCIUM CHLORIDE 600; 310; 30; 20 MG/100ML; MG/100ML; MG/100ML; MG/100ML
2000 INJECTION, SOLUTION INTRAVENOUS ONCE
Status: COMPLETED | OUTPATIENT
Start: 2021-06-08 | End: 2021-06-08

## 2021-06-08 RX ORDER — ONDANSETRON 2 MG/ML
4 INJECTION INTRAMUSCULAR; INTRAVENOUS ONCE
Status: COMPLETED | OUTPATIENT
Start: 2021-06-08 | End: 2021-06-08

## 2021-06-08 RX ADMIN — ONDANSETRON 4 MG: 2 INJECTION INTRAMUSCULAR; INTRAVENOUS at 07:05

## 2021-06-08 RX ADMIN — SODIUM CHLORIDE, POTASSIUM CHLORIDE, SODIUM LACTATE AND CALCIUM CHLORIDE 2000 ML: 600; 310; 30; 20 INJECTION, SOLUTION INTRAVENOUS at 07:06

## 2021-06-08 RX ADMIN — METOCLOPRAMIDE 10 MG: 5 INJECTION, SOLUTION INTRAMUSCULAR; INTRAVENOUS at 07:05

## 2021-06-08 ASSESSMENT — ENCOUNTER SYMPTOMS
VOMITING: 1
NAUSEA: 1
ABDOMINAL PAIN: 0
ABDOMINAL DISTENTION: 0

## 2021-06-08 NOTE — ED PROVIDER NOTES
Friends and Family:     Frequency of Social Gatherings with Friends and Family:     Attends Mu-ism Services:     Active Member of Clubs or Organizations:     Attends Club or Organization Meetings:     Marital Status:    Intimate Partner Violence:     Fear of Current or Ex-Partner:     Emotionally Abused:     Physically Abused:     Sexually Abused:      No current facility-administered medications for this encounter. Current Outpatient Medications   Medication Sig Dispense Refill    promethazine (PHENERGAN) 25 MG tablet Take 25 mg by mouth every 6 hours as needed for Nausea      metoclopramide (REGLAN) 10 MG tablet Take 1 tablet by mouth 4 times daily 30 tablet 1    ibuprofen (ADVIL;MOTRIN) 800 MG tablet Take 1 tablet by mouth every 8 hours as needed for Pain 120 tablet 3    sertraline (ZOLOFT) 25 MG tablet Take 25 mg by mouth daily      Prenatal MV-Min-Fe Fum-FA-DHA (PRENATAL 1 PO) Take by mouth       No Known Allergies      ROS:    Review of Systems   Constitutional: Positive for activity change and appetite change. Gastrointestinal: Positive for nausea and vomiting. Negative for abdominal distention and abdominal pain. Genitourinary: Negative. All other systems reviewed and are negative. Nursing Notes Reviewed    Physical Exam:  ED Triage Vitals [06/08/21 0642]   Enc Vitals Group      BP (!) 123/91      Pulse 107      Resp 16      Temp 99 °F (37.2 °C)      Temp Source Oral      SpO2 99 %      Weight 122 lb (55.3 kg)      Height       Head Circumference       Peak Flow       Pain Score       Pain Loc       Pain Edu? Excl. in [address]? Physical Exam  Vitals and nursing note reviewed. Exam conducted with a chaperone present. Constitutional:       General: She is not in acute distress. Appearance: She is well-developed. She is ill-appearing. She is not toxic-appearing or diaphoretic. HENT:      Head: Normocephalic and atraumatic.       Right Ear: Ear canal and external ear normal.      Left Ear: Ear canal and external ear normal.      Mouth/Throat:      Mouth: Mucous membranes are dry. Eyes:      General: No scleral icterus. Right eye: No discharge. Left eye: No discharge. Conjunctiva/sclera: Conjunctivae normal.      Pupils: Pupils are equal, round, and reactive to light. Neck:      Thyroid: No thyromegaly. Vascular: No JVD. Trachea: No tracheal deviation. Cardiovascular:      Rate and Rhythm: Normal rate and regular rhythm. Heart sounds: Normal heart sounds. No murmur heard. No friction rub. No gallop. Pulmonary:      Effort: Pulmonary effort is normal. No respiratory distress. Breath sounds: Normal breath sounds. No stridor. No wheezing or rales. Chest:      Chest wall: No tenderness. Abdominal:      General: Abdomen is flat. A surgical scar is present. Bowel sounds are normal. There is no distension. Palpations: Abdomen is soft. There is no mass. Tenderness: There is no abdominal tenderness. There is no guarding or rebound. Hernia: No hernia is present. Musculoskeletal:         General: No tenderness or deformity. Normal range of motion. Cervical back: Normal range of motion and neck supple. Lymphadenopathy:      Cervical: No cervical adenopathy. Skin:     General: Skin is warm and dry. Coloration: Skin is not pale. Findings: No erythema or rash. Neurological:      Mental Status: She is alert and oriented to person, place, and time. Cranial Nerves: No cranial nerve deficit. Sensory: No sensory deficit. Deep Tendon Reflexes: Reflexes are normal and symmetric. Reflexes normal.   Psychiatric:         Mood and Affect: Mood normal.         Speech: Speech normal.         Behavior: Behavior normal.         Thought Content:  Thought content normal.         Judgment: Judgment normal.         I have reviewed and interpreted all of the currently available lab results from this visit (ifapplicable):  Results for orders placed or performed during the hospital encounter of 85/77/14   Basic Metabolic Panel w/ Reflex to MG   Result Value Ref Range    Sodium 135 135 - 145 MMOL/L    Potassium 4.0 3.5 - 5.1 MMOL/L    Chloride 102 99 - 110 mMol/L    CO2 30 21 - 32 MMOL/L    Anion Gap 3 (L) 4 - 16    BUN 8 6 - 23 MG/DL    CREATININE 0.6 0.6 - 1.1 MG/DL    Glucose 98 70 - 99 MG/DL    Calcium 9.4 8.3 - 10.6 MG/DL    GFR Non-African American >60 >60 mL/min/1.73m2    GFR African American >60 >60 mL/min/1.73m2      Radiographs (if obtained):  [] The following radiograph wasinterpreted by myself in the absence of a radiologist:   [] Radiologist's Report Reviewed:  No orders to display         EKG (if obtained): (All EKG's are interpreted by myself in the absence of a cardiologist)    Chart review shows recent radiographs:  No results found. MDM:      Patient presents to the ED with hyperemesis. I cannot establish what her normal weight is I cannot officially call her hyperemesis gravidarum but clearly she has underlying morning sickness failed outpatient treatment with Phenergan she was under going IV hydration with improvement she received Reglan. I will send her home with an oral prescription she can use Phenergan intermittently as well. Fortunately there is no underlying renal acid-base disturbance. Please note that portions of this note may have been completed with a voice recognition/dictation program. Efforts were made to edit the dictations but occasionally words are mis-transcribed.      All pertinent Lab data and radiographic results reviewed with patient at bedside. The patient and/or the family were informed of the results of any tests/labs/imaging, the treatment plan, and time was allotted to answer questions. See chart for details of medications given during the ED stay.     The likelihood of other entities in the differential is insufficient to justify any further testing for them. This was explained to the patient. The patient was advised that persistent or worsening symptoms would require further evaluation.                Clinical Impression:  1. Mild hyperemesis gravidarum, antepartum      Disposition referral (if applicable):    OB MD  Schedule an appointment as soon as possible for a visit in 2 days  If symptoms worsen    Disposition medications (if applicable):  New Prescriptions    METOCLOPRAMIDE (REGLAN) 10 MG TABLET    Take 1 tablet by mouth 4 times daily           Dino Andrews DO, FACEP      Comment: Please note this report has been produced using speech recognition software and maycontain errors related to that system including errors in grammar, punctuation, and spelling, as well as words and phrases that may be inappropriate. If there are any questions or concerns please feel free to contact thedictating provider for clarification.         Veena Mejía DO  06/08/21 5982

## 2021-06-11 LAB
C. TRACHOMATIS, EXTERNAL RESULT: NEGATIVE
N. GONORRHOEAE, EXTERNAL RESULT: NEGATIVE

## 2021-06-23 LAB
ABO, EXTERNAL RESULT: NORMAL
HEP B, EXTERNAL RESULT: NEGATIVE
HIV, EXTERNAL RESULT: NON REACTIVE
RH FACTOR, EXTERNAL RESULT: POSITIVE
RUBELLA TITER, EXTERNAL RESULT: NORMAL

## 2021-09-15 ENCOUNTER — HOSPITAL ENCOUNTER (OUTPATIENT)
Dept: ULTRASOUND IMAGING | Age: 21
Discharge: HOME | End: 2021-09-15
Payer: OTHER GOVERNMENT

## 2021-09-15 DIAGNOSIS — Z36.89 ENCOUNTER FOR ROUTINE FETAL ULTRASOUND: ICD-10-CM

## 2021-09-15 PROCEDURE — 76805 OB US >/= 14 WKS SNGL FETUS: CPT

## 2021-10-11 LAB — RPR, EXTERNAL RESULT: NON REACTIVE

## 2021-12-16 LAB — GBS, EXTERNAL RESULT: NEGATIVE

## 2021-12-30 ENCOUNTER — HOSPITAL ENCOUNTER (OUTPATIENT)
Age: 21
Discharge: HOME | End: 2022-01-03
Payer: COMMERCIAL

## 2021-12-30 LAB — SARS-COV-2: NOT DETECTED

## 2021-12-30 PROCEDURE — U0005 INFEC AGEN DETEC AMPLI PROBE: HCPCS

## 2021-12-30 PROCEDURE — U0003 INFECTIOUS AGENT DETECTION BY NUCLEIC ACID (DNA OR RNA); SEVERE ACUTE RESPIRATORY SYNDROME CORONAVIRUS 2 (SARS-COV-2) (CORONAVIRUS DISEASE [COVID-19]), AMPLIFIED PROBE TECHNIQUE, MAKING USE OF HIGH THROUGHPUT TECHNOLOGIES AS DESCRIBED BY CMS-2020-01-R: HCPCS

## 2022-01-06 ENCOUNTER — HOSPITAL ENCOUNTER (OUTPATIENT)
Age: 22
Discharge: HOME | End: 2022-01-06
Payer: COMMERCIAL

## 2022-01-06 PROCEDURE — U0003 INFECTIOUS AGENT DETECTION BY NUCLEIC ACID (DNA OR RNA); SEVERE ACUTE RESPIRATORY SYNDROME CORONAVIRUS 2 (SARS-COV-2) (CORONAVIRUS DISEASE [COVID-19]), AMPLIFIED PROBE TECHNIQUE, MAKING USE OF HIGH THROUGHPUT TECHNOLOGIES AS DESCRIBED BY CMS-2020-01-R: HCPCS

## 2022-01-06 PROCEDURE — U0005 INFEC AGEN DETEC AMPLI PROBE: HCPCS

## 2022-01-07 LAB — SARS-COV-2, PCR: NOT DETECTED

## 2022-01-13 ENCOUNTER — HOSPITAL ENCOUNTER (OUTPATIENT)
Age: 22
Discharge: HOME | End: 2022-01-17
Payer: OTHER GOVERNMENT

## 2022-01-13 LAB — SARS-COV-2: NOT DETECTED

## 2022-01-13 PROCEDURE — U0005 INFEC AGEN DETEC AMPLI PROBE: HCPCS

## 2022-01-13 PROCEDURE — U0003 INFECTIOUS AGENT DETECTION BY NUCLEIC ACID (DNA OR RNA); SEVERE ACUTE RESPIRATORY SYNDROME CORONAVIRUS 2 (SARS-COV-2) (CORONAVIRUS DISEASE [COVID-19]), AMPLIFIED PROBE TECHNIQUE, MAKING USE OF HIGH THROUGHPUT TECHNOLOGIES AS DESCRIBED BY CMS-2020-01-R: HCPCS

## 2022-01-17 ENCOUNTER — HOSPITAL ENCOUNTER (INPATIENT)
Age: 22
LOS: 2 days | Discharge: HOME | End: 2022-01-19
Attending: STUDENT IN AN ORGANIZED HEALTH CARE EDUCATION/TRAINING PROGRAM | Admitting: STUDENT IN AN ORGANIZED HEALTH CARE EDUCATION/TRAINING PROGRAM
Payer: COMMERCIAL

## 2022-01-17 ENCOUNTER — ANESTHESIA (OUTPATIENT)
Dept: LABOR AND DELIVERY | Age: 22
End: 2022-01-17
Payer: COMMERCIAL

## 2022-01-17 ENCOUNTER — ANESTHESIA EVENT (OUTPATIENT)
Dept: LABOR AND DELIVERY | Age: 22
End: 2022-01-17
Payer: COMMERCIAL

## 2022-01-17 PROBLEM — Z37.9 NORMAL LABOR: Status: ACTIVE | Noted: 2022-01-17

## 2022-01-17 LAB
ABO/RH: NORMAL
AMPHETAMINE SCREEN, URINE: NORMAL
ANTIBODY SCREEN: NORMAL
BARBITURATE SCREEN URINE: NORMAL
BASOPHILS ABSOLUTE: 0 K/UL (ref 0–0.2)
BASOPHILS RELATIVE PERCENT: 0.4 %
BENZODIAZEPINE SCREEN, URINE: NORMAL
BUPRENORPHINE URINE: NORMAL
CANNABINOID SCREEN URINE: NORMAL
COCAINE METABOLITE SCREEN URINE: NORMAL
EOSINOPHILS ABSOLUTE: 0.1 K/UL (ref 0–0.6)
EOSINOPHILS RELATIVE PERCENT: 0.8 %
HCT VFR BLD CALC: 25.7 % (ref 36–48)
HEMATOLOGY PATH CONSULT: NORMAL
HEMATOLOGY PATH CONSULT: YES
HEMOGLOBIN: 8.1 G/DL (ref 12–16)
LYMPHOCYTES ABSOLUTE: 2.1 K/UL (ref 1–5.1)
LYMPHOCYTES RELATIVE PERCENT: 23.2 %
Lab: NORMAL
MCH RBC QN AUTO: 21.9 PG (ref 26–34)
MCHC RBC AUTO-ENTMCNC: 31.3 G/DL (ref 31–36)
MCV RBC AUTO: 69.9 FL (ref 80–100)
METHADONE SCREEN, URINE: NORMAL
MONOCYTES ABSOLUTE: 0.6 K/UL (ref 0–1.3)
MONOCYTES RELATIVE PERCENT: 6.2 %
NEUTROPHILS ABSOLUTE: 6.2 K/UL (ref 1.7–7.7)
NEUTROPHILS RELATIVE PERCENT: 69.4 %
OPIATE SCREEN URINE: NORMAL
OXYCODONE URINE: NORMAL
PDW BLD-RTO: 17.9 % (ref 12.4–15.4)
PH UA: 7
PHENCYCLIDINE SCREEN URINE: NORMAL
PLATELET # BLD: 257 K/UL (ref 135–450)
PMV BLD AUTO: 7.9 FL (ref 5–10.5)
PROPOXYPHENE SCREEN: NORMAL
RBC # BLD: 3.68 M/UL (ref 4–5.2)
RPR: NORMAL
SLIDE REVIEW: ABNORMAL
WBC # BLD: 9 K/UL (ref 4–11)

## 2022-01-17 PROCEDURE — 3E0P7VZ INTRODUCTION OF HORMONE INTO FEMALE REPRODUCTIVE, VIA NATURAL OR ARTIFICIAL OPENING: ICD-10-PCS | Performed by: STUDENT IN AN ORGANIZED HEALTH CARE EDUCATION/TRAINING PROGRAM

## 2022-01-17 PROCEDURE — 1220000000 HC SEMI PRIVATE OB R&B

## 2022-01-17 PROCEDURE — 51701 INSERT BLADDER CATHETER: CPT

## 2022-01-17 PROCEDURE — 86900 BLOOD TYPING SEROLOGIC ABO: CPT

## 2022-01-17 PROCEDURE — 2500000003 HC RX 250 WO HCPCS: Performed by: NURSE ANESTHETIST, CERTIFIED REGISTERED

## 2022-01-17 PROCEDURE — 2580000003 HC RX 258: Performed by: STUDENT IN AN ORGANIZED HEALTH CARE EDUCATION/TRAINING PROGRAM

## 2022-01-17 PROCEDURE — 80307 DRUG TEST PRSMV CHEM ANLYZR: CPT

## 2022-01-17 PROCEDURE — 6370000000 HC RX 637 (ALT 250 FOR IP)

## 2022-01-17 PROCEDURE — 0HQ9XZZ REPAIR PERINEUM SKIN, EXTERNAL APPROACH: ICD-10-PCS | Performed by: STUDENT IN AN ORGANIZED HEALTH CARE EDUCATION/TRAINING PROGRAM

## 2022-01-17 PROCEDURE — 6370000000 HC RX 637 (ALT 250 FOR IP): Performed by: STUDENT IN AN ORGANIZED HEALTH CARE EDUCATION/TRAINING PROGRAM

## 2022-01-17 PROCEDURE — 85025 COMPLETE CBC W/AUTO DIFF WBC: CPT

## 2022-01-17 PROCEDURE — 7200000001 HC VAGINAL DELIVERY

## 2022-01-17 PROCEDURE — 6360000002 HC RX W HCPCS: Performed by: STUDENT IN AN ORGANIZED HEALTH CARE EDUCATION/TRAINING PROGRAM

## 2022-01-17 PROCEDURE — 86592 SYPHILIS TEST NON-TREP QUAL: CPT

## 2022-01-17 PROCEDURE — 86850 RBC ANTIBODY SCREEN: CPT

## 2022-01-17 PROCEDURE — 86901 BLOOD TYPING SEROLOGIC RH(D): CPT

## 2022-01-17 PROCEDURE — 3700000025 EPIDURAL BLOCK: Performed by: ANESTHESIOLOGY

## 2022-01-17 RX ORDER — DOCUSATE SODIUM 100 MG/1
100 CAPSULE, LIQUID FILLED ORAL 2 TIMES DAILY
Status: DISCONTINUED | OUTPATIENT
Start: 2022-01-17 | End: 2022-01-17

## 2022-01-17 RX ORDER — SODIUM CHLORIDE 0.9 % (FLUSH) 0.9 %
5-40 SYRINGE (ML) INJECTION EVERY 12 HOURS SCHEDULED
Status: DISCONTINUED | OUTPATIENT
Start: 2022-01-17 | End: 2022-01-19 | Stop reason: HOSPADM

## 2022-01-17 RX ORDER — SODIUM CHLORIDE, SODIUM LACTATE, POTASSIUM CHLORIDE, CALCIUM CHLORIDE 600; 310; 30; 20 MG/100ML; MG/100ML; MG/100ML; MG/100ML
INJECTION, SOLUTION INTRAVENOUS CONTINUOUS
Status: DISCONTINUED | OUTPATIENT
Start: 2022-01-17 | End: 2022-01-19 | Stop reason: HOSPADM

## 2022-01-17 RX ORDER — BUPIVACAINE HYDROCHLORIDE 2.5 MG/ML
INJECTION, SOLUTION EPIDURAL; INFILTRATION; INTRACAUDAL PRN
Status: DISCONTINUED | OUTPATIENT
Start: 2022-01-17 | End: 2022-01-17 | Stop reason: SDUPTHER

## 2022-01-17 RX ORDER — BUPIVACAINE HYDROCHLORIDE 5 MG/ML
INJECTION, SOLUTION EPIDURAL; INTRACAUDAL
Status: DISCONTINUED
Start: 2022-01-17 | End: 2022-01-17

## 2022-01-17 RX ORDER — SODIUM CHLORIDE 0.9 % (FLUSH) 0.9 %
5-40 SYRINGE (ML) INJECTION EVERY 12 HOURS SCHEDULED
Status: DISCONTINUED | OUTPATIENT
Start: 2022-01-17 | End: 2022-01-17

## 2022-01-17 RX ORDER — IBUPROFEN 800 MG/1
800 TABLET ORAL EVERY 6 HOURS PRN
Status: DISCONTINUED | OUTPATIENT
Start: 2022-01-17 | End: 2022-01-19 | Stop reason: HOSPADM

## 2022-01-17 RX ORDER — FERROUS SULFATE 325(65) MG
325 TABLET ORAL 2 TIMES DAILY WITH MEALS
Status: DISCONTINUED | OUTPATIENT
Start: 2022-01-17 | End: 2022-01-19 | Stop reason: HOSPADM

## 2022-01-17 RX ORDER — FERROUS SULFATE 325(65) MG
325 TABLET ORAL
COMMUNITY

## 2022-01-17 RX ORDER — PROMETHAZINE HYDROCHLORIDE 25 MG/1
25 TABLET ORAL DAILY
Status: ON HOLD | COMMUNITY
Start: 2021-12-22 | End: 2022-01-19 | Stop reason: HOSPADM

## 2022-01-17 RX ORDER — SODIUM CHLORIDE, SODIUM LACTATE, POTASSIUM CHLORIDE, AND CALCIUM CHLORIDE .6; .31; .03; .02 G/100ML; G/100ML; G/100ML; G/100ML
1000 INJECTION, SOLUTION INTRAVENOUS PRN
Status: DISCONTINUED | OUTPATIENT
Start: 2022-01-17 | End: 2022-01-17

## 2022-01-17 RX ORDER — TERBUTALINE SULFATE 1 MG/ML
0.25 INJECTION, SOLUTION SUBCUTANEOUS ONCE
Status: DISCONTINUED | OUTPATIENT
Start: 2022-01-17 | End: 2022-01-17

## 2022-01-17 RX ORDER — SODIUM CHLORIDE 9 MG/ML
25 INJECTION, SOLUTION INTRAVENOUS PRN
Status: DISCONTINUED | OUTPATIENT
Start: 2022-01-17 | End: 2022-01-19 | Stop reason: HOSPADM

## 2022-01-17 RX ORDER — ACETAMINOPHEN 325 MG/1
650 TABLET ORAL EVERY 4 HOURS PRN
Status: DISCONTINUED | OUTPATIENT
Start: 2022-01-17 | End: 2022-01-19 | Stop reason: HOSPADM

## 2022-01-17 RX ORDER — SODIUM CHLORIDE 0.9 % (FLUSH) 0.9 %
5-40 SYRINGE (ML) INJECTION PRN
Status: DISCONTINUED | OUTPATIENT
Start: 2022-01-17 | End: 2022-01-17

## 2022-01-17 RX ORDER — ONDANSETRON 2 MG/ML
4 INJECTION INTRAMUSCULAR; INTRAVENOUS EVERY 6 HOURS PRN
Status: DISCONTINUED | OUTPATIENT
Start: 2022-01-17 | End: 2022-01-17

## 2022-01-17 RX ORDER — SODIUM CHLORIDE 0.9 % (FLUSH) 0.9 %
5-40 SYRINGE (ML) INJECTION PRN
Status: DISCONTINUED | OUTPATIENT
Start: 2022-01-17 | End: 2022-01-19 | Stop reason: HOSPADM

## 2022-01-17 RX ORDER — BUPIVACAINE HYDROCHLORIDE 2.5 MG/ML
INJECTION, SOLUTION EPIDURAL; INFILTRATION; INTRACAUDAL
Status: COMPLETED
Start: 2022-01-17 | End: 2022-01-17

## 2022-01-17 RX ORDER — CARBOPROST TROMETHAMINE 250 UG/ML
250 INJECTION, SOLUTION INTRAMUSCULAR PRN
Status: DISCONTINUED | OUTPATIENT
Start: 2022-01-17 | End: 2022-01-19 | Stop reason: HOSPADM

## 2022-01-17 RX ORDER — SODIUM CHLORIDE, SODIUM LACTATE, POTASSIUM CHLORIDE, AND CALCIUM CHLORIDE .6; .31; .03; .02 G/100ML; G/100ML; G/100ML; G/100ML
500 INJECTION, SOLUTION INTRAVENOUS PRN
Status: DISCONTINUED | OUTPATIENT
Start: 2022-01-17 | End: 2022-01-17

## 2022-01-17 RX ORDER — BUPIVACAINE HYDROCHLORIDE 2.5 MG/ML
INJECTION, SOLUTION EPIDURAL; INFILTRATION; INTRACAUDAL PRN
Status: DISCONTINUED | OUTPATIENT
Start: 2022-01-17 | End: 2022-01-17

## 2022-01-17 RX ORDER — METHYLERGONOVINE MALEATE 0.2 MG/ML
200 INJECTION INTRAVENOUS PRN
Status: DISCONTINUED | OUTPATIENT
Start: 2022-01-17 | End: 2022-01-19 | Stop reason: HOSPADM

## 2022-01-17 RX ORDER — MISOPROSTOL 100 UG/1
800 TABLET ORAL PRN
Status: DISCONTINUED | OUTPATIENT
Start: 2022-01-17 | End: 2022-01-19 | Stop reason: HOSPADM

## 2022-01-17 RX ORDER — ONDANSETRON 8 MG/1
8 TABLET, ORALLY DISINTEGRATING ORAL EVERY 8 HOURS PRN
Status: DISCONTINUED | OUTPATIENT
Start: 2022-01-17 | End: 2022-01-19 | Stop reason: HOSPADM

## 2022-01-17 RX ORDER — SODIUM CHLORIDE, SODIUM LACTATE, POTASSIUM CHLORIDE, CALCIUM CHLORIDE 600; 310; 30; 20 MG/100ML; MG/100ML; MG/100ML; MG/100ML
INJECTION, SOLUTION INTRAVENOUS CONTINUOUS
Status: DISCONTINUED | OUTPATIENT
Start: 2022-01-17 | End: 2022-01-17

## 2022-01-17 RX ORDER — BUTORPHANOL TARTRATE 1 MG/ML
1 INJECTION, SOLUTION INTRAMUSCULAR; INTRAVENOUS
Status: DISCONTINUED | OUTPATIENT
Start: 2022-01-17 | End: 2022-01-17

## 2022-01-17 RX ORDER — DOCUSATE SODIUM 100 MG/1
100 CAPSULE, LIQUID FILLED ORAL 2 TIMES DAILY
Status: DISCONTINUED | OUTPATIENT
Start: 2022-01-17 | End: 2022-01-19 | Stop reason: HOSPADM

## 2022-01-17 RX ORDER — LANOLIN 100 %
OINTMENT (GRAM) TOPICAL PRN
Status: DISCONTINUED | OUTPATIENT
Start: 2022-01-17 | End: 2022-01-19 | Stop reason: HOSPADM

## 2022-01-17 RX ORDER — SODIUM CHLORIDE 9 MG/ML
25 INJECTION, SOLUTION INTRAVENOUS PRN
Status: DISCONTINUED | OUTPATIENT
Start: 2022-01-17 | End: 2022-01-17

## 2022-01-17 RX ADMIN — SODIUM CHLORIDE, POTASSIUM CHLORIDE, SODIUM LACTATE AND CALCIUM CHLORIDE: 600; 310; 30; 20 INJECTION, SOLUTION INTRAVENOUS at 16:42

## 2022-01-17 RX ADMIN — Medication 25 MCG: at 09:22

## 2022-01-17 RX ADMIN — BUPIVACAINE HYDROCHLORIDE 1.8 ML: 2.5 INJECTION, SOLUTION EPIDURAL; INFILTRATION; INTRACAUDAL; PERINEURAL at 13:51

## 2022-01-17 RX ADMIN — ACETAMINOPHEN 650 MG: 325 TABLET ORAL at 20:40

## 2022-01-17 RX ADMIN — BENZOCAINE AND LEVOMENTHOL: 200; 5 SPRAY TOPICAL at 20:40

## 2022-01-17 RX ADMIN — Medication 15 ML/HR: at 13:55

## 2022-01-17 RX ADMIN — SODIUM CHLORIDE, POTASSIUM CHLORIDE, SODIUM LACTATE AND CALCIUM CHLORIDE 500 ML: 600; 310; 30; 20 INJECTION, SOLUTION INTRAVENOUS at 13:24

## 2022-01-17 RX ADMIN — SODIUM CHLORIDE, POTASSIUM CHLORIDE, SODIUM LACTATE AND CALCIUM CHLORIDE: 600; 310; 30; 20 INJECTION, SOLUTION INTRAVENOUS at 08:47

## 2022-01-17 RX ADMIN — Medication 87.3 MILLI-UNITS/MIN: at 18:42

## 2022-01-17 ASSESSMENT — PAIN SCALES - GENERAL: PAINLEVEL_OUTOF10: 3

## 2022-01-17 NOTE — PROGRESS NOTES
Dr. Elmira Orozco notified in person of [address]. Pt is 6-7/100%/-1. Membranes are still intact. No new orders at this time.

## 2022-01-17 NOTE — PROGRESS NOTES
Pt placed on EFM at this time. Pt reports feeling infant move. Pt denies any vaginal bleeding. Pt reports feeling occasional cramping.
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
## 2022-01-17 NOTE — PROGRESS NOTES
Dr. Jazzmine Corrales notified that pt Poeyas [phone] at [phone]. SVE performed by RN. Pt is AC/100%/+1. RN setting up room for delivery.

## 2022-01-17 NOTE — PROGRESS NOTES
TToon CRNA at bedside for epidural placement.     1348: Epidural placed at this time  1349: Test dose given

## 2022-01-17 NOTE — ANESTHESIA PROCEDURE NOTES
Epidural Block    Patient location during procedure: OB  Start time: 1/17/2022 1:50 PM  End time: 1/17/2022 1:55 PM  Reason for block: labor epidural  Staffing  Performed: resident/CRNA   Anesthesiologist: Gio Marsh MD  Resident/CRNA: QUIRINO Carpenter CRNA  Preanesthetic Checklist  Completed: patient identified, IV checked, site marked, risks and benefits discussed, monitors and equipment checked, pre-op evaluation, timeout performed, anesthesia consent given, oxygen available and patient being monitored  Epidural  Patient position: sitting  Prep: Betadine  Patient monitoring: cardiac monitor, continuous pulse ox and frequent blood pressure checks  Approach: midline  Location: lumbar (1-5)  Injection technique: DEVYN saline  Provider prep: mask and sterile gloves  Needle  Needle type: Tuohy   Needle gauge: 17 G  Needle length: 3.5 in  Catheter type: side hole  Catheter size: 19 G (20 G)  Test dose: negative (3 cc of 1.5 % xylocaine with epi)  Assessment  Sensory level: T8  Hemodynamics: stable  Attempts: 1  Additional Notes  Pt. prepped and draped in sterile fashion. Skin wheal with 1% lidocaine. 17ga touhy needle to DEVYN. 25 ga. Spinal needle per touhy. CSF visualized in hub and 1.8 cc of 0.25 % bupivacaine injected. Needle withdrawn and catheter threaded. Negative test dose. Catheter secured with sterile dressing.

## 2022-01-17 NOTE — PROGRESS NOTES
Dr. Rashmi Oates at bedside to evaluate pt. SVE performed. Pt is 4-5/70%/-2. Membranes still intact. Plan is for pt to get epidural and for RN to recheck pt in 2 hours.

## 2022-01-17 NOTE — PROGRESS NOTES
Dr. Monica Peña at bedside. SVE performed. Pt is 3/40%/-3. Plan is to do cytotec.    0293: Cytotec placed at this time by Dr. Monica Peña.

## 2022-01-17 NOTE — PROGRESS NOTES
Department of Obstetrics and Gynecology  Labor and Delivery   Progress Note    SUBJECTIVE:  Patient is feeling pain with contractions    OBJECTIVE:      Fetal heart rate:       Baseline Heart Rate:  130s       Accelerations:  present       Long Term Variability:  moderate       Decelerations:  none         Contraction frequency: 2 minutes    Membranes:  Intact    Cervix:         Dilation:  4 cm         Effacement:  70%         Station:  -2          ASSESSMENT & PLAN:  20 yo  at 41w1d admitted for IOL    - VSS, FHT reassuring  - s/p cytotec  - requesting epidural; patient may receive epidural  - next cervical exam in 2-3 hours; will evaluate for need for IV pit and/or AROM  - expecting vaginal delivery    Oscar Perez MD
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
## 2022-01-18 LAB
BASOPHILS ABSOLUTE: 0 K/UL (ref 0–0.2)
BASOPHILS RELATIVE PERCENT: 0.4 %
EOSINOPHILS ABSOLUTE: 0 K/UL (ref 0–0.6)
EOSINOPHILS RELATIVE PERCENT: 0.4 %
HCT VFR BLD CALC: 23.4 % (ref 36–48)
HEMOGLOBIN: 7.3 G/DL (ref 12–16)
LYMPHOCYTES ABSOLUTE: 1.6 K/UL (ref 1–5.1)
LYMPHOCYTES RELATIVE PERCENT: 16 %
MCH RBC QN AUTO: 21.8 PG (ref 26–34)
MCHC RBC AUTO-ENTMCNC: 31.1 G/DL (ref 31–36)
MCV RBC AUTO: 70 FL (ref 80–100)
MONOCYTES ABSOLUTE: 0.8 K/UL (ref 0–1.3)
MONOCYTES RELATIVE PERCENT: 7.7 %
NEUTROPHILS ABSOLUTE: 7.4 K/UL (ref 1.7–7.7)
NEUTROPHILS RELATIVE PERCENT: 75.5 %
PDW BLD-RTO: 17.9 % (ref 12.4–15.4)
PLATELET # BLD: 222 K/UL (ref 135–450)
PMV BLD AUTO: 7.6 FL (ref 5–10.5)
RBC # BLD: 3.35 M/UL (ref 4–5.2)
REASON FOR REJECTION: NORMAL
REJECTED TEST: NORMAL
WBC # BLD: 9.9 K/UL (ref 4–11)

## 2022-01-18 PROCEDURE — 6370000000 HC RX 637 (ALT 250 FOR IP): Performed by: STUDENT IN AN ORGANIZED HEALTH CARE EDUCATION/TRAINING PROGRAM

## 2022-01-18 PROCEDURE — 36415 COLL VENOUS BLD VENIPUNCTURE: CPT

## 2022-01-18 PROCEDURE — 85025 COMPLETE CBC W/AUTO DIFF WBC: CPT

## 2022-01-18 PROCEDURE — 1220000000 HC SEMI PRIVATE OB R&B

## 2022-01-18 PROCEDURE — 2580000003 HC RX 258: Performed by: STUDENT IN AN ORGANIZED HEALTH CARE EDUCATION/TRAINING PROGRAM

## 2022-01-18 RX ADMIN — DOCUSATE SODIUM 100 MG: 100 CAPSULE ORAL at 08:29

## 2022-01-18 RX ADMIN — IBUPROFEN 800 MG: 800 TABLET, FILM COATED ORAL at 04:11

## 2022-01-18 RX ADMIN — Medication 10 ML: at 20:45

## 2022-01-18 RX ADMIN — IBUPROFEN 800 MG: 800 TABLET, FILM COATED ORAL at 13:30

## 2022-01-18 RX ADMIN — ACETAMINOPHEN 650 MG: 325 TABLET ORAL at 16:50

## 2022-01-18 RX ADMIN — ACETAMINOPHEN 650 MG: 325 TABLET ORAL at 08:29

## 2022-01-18 RX ADMIN — IBUPROFEN 800 MG: 800 TABLET, FILM COATED ORAL at 23:00

## 2022-01-18 RX ADMIN — FERROUS SULFATE TAB 325 MG (65 MG ELEMENTAL FE) 325 MG: 325 (65 FE) TAB at 08:29

## 2022-01-18 RX ADMIN — DOCUSATE SODIUM 100 MG: 100 CAPSULE ORAL at 20:44

## 2022-01-18 RX ADMIN — FERROUS SULFATE TAB 325 MG (65 MG ELEMENTAL FE) 325 MG: 325 (65 FE) TAB at 16:50

## 2022-01-18 ASSESSMENT — PAIN SCALES - GENERAL
PAINLEVEL_OUTOF10: 3

## 2022-01-18 NOTE — PROGRESS NOTES
Department of Obstetrics and Gynecology  Labor and Delivery  Attending Post Partum Progress Note      SUBJECTIVE:  Pt without complaints, lochia=menses, adequate pain control    OBJECTIVE:      Vitals:  /60   Pulse 75   Temp 98 °F (36.7 °C) (Oral)   Resp 16   Ht 5' 4\" (1.626 m)   Wt 150 lb (68 kg)   SpO2 100%   Breastfeeding Unknown   BMI 25.75 kg/m²     ABDOMEN:  No scars, normal bowel sounds, soft, non-distended, non-tender, no masses palpated, no hepatosplenomegally  GENITAL/URINARY:  deferred    DATA:    CBC:    Lab Results   Component Value Date    WBC 9.9 01/18/2022    RBC 3.35 01/18/2022    HGB 7.3 01/18/2022    HCT 23.4 01/18/2022    MCV 70.0 01/18/2022    RDW 17.9 01/18/2022     01/18/2022       ASSESSMENT & PLAN:      Principal Problem:    Vaginal delivery  Plan: cont nl pp care  Active Problems:    Anemia secondary to blood loss  Plan: iron/colace

## 2022-01-18 NOTE — PLAN OF CARE
Problem: VAGINAL DELIVERY - RECOVERY AND POST PARTUM  Goal: Vital signs are medically acceptable  Outcome: Ongoing  Goal: Patient will remain free of falls  Outcome: Ongoing  Goal: Fundus firm at midline  Outcome: Ongoing  Goal: Moderate rubra without clots, no purulent discharge, no foul smelling lochia  Outcome: Ongoing  Goal: Empties bladder  Outcome: Ongoing  Goal: Verbalizes understanding of normal bowel function resumption  Outcome: Ongoing  Goal: Edema will be absent or minimal  Outcome: Ongoing  Goal: Breasts are soft with nipple integrity intact  Outcome: Ongoing  Goal: Demonstrates appropriate breast feeding techniques  Outcome: Ongoing  Goal: Appropriate behavior observed  Outcome: Ongoing  Goal: Positive Mother-Baby interactions are observed  Outcome: Ongoing  Goal: Perineum intact without discharge or hematoma  Outcome: Ongoing  Goal: Ambulates independently  Outcome: Ongoing     Problem: PAIN  Goal: Patient's pain/discomfort is manageable  Outcome: Ongoing     Problem: KNOWLEDGE DEFICIT  Goal: Patient/S.O. demonstrates understanding of disease process, treatment plan, medications, and discharge instructions.   Outcome: Ongoing     Problem: Anxiety:  Goal: Level of anxiety will decrease  Description: Level of anxiety will decrease  1/17/2022 1936 by Ramsey Jeong RN  Outcome: Completed  1/17/2022 0950 by Ramsey Jeong RN  Outcome: Ongoing     Problem: Breathing Pattern - Ineffective:  Goal: Able to breathe comfortably  Description: Able to breathe comfortably  1/17/2022 1936 by Ramsey Jeong RN  Outcome: Completed  1/17/2022 0950 by Ramsey Jeong RN  Outcome: Ongoing     Problem: Fluid Volume - Imbalance:  Goal: Absence of imbalanced fluid volume signs and symptoms  Description: Absence of imbalanced fluid volume signs and symptoms  1/17/2022 1936 by Ramsey Jeong RN  Outcome: Completed  1/17/2022 0950 by Ramsey Jeong RN  Outcome: Ongoing  Goal: Absence of intrapartum hemorrhage signs and symptoms  Description: Absence of intrapartum hemorrhage signs and symptoms  2022 by Lorna Do RN  Outcome: Completed  2022 0950 by Lorna Do RN  Outcome: Ongoing     Problem: Infection - Intrapartum Infection:  Goal: Will show no infection signs and symptoms  Description: Will show no infection signs and symptoms  2022 by Lorna Do RN  Outcome: Completed  2022 0950 by Lorna Do RN  Outcome: Ongoing     Problem: Labor Process - Prolonged:  Goal: Labor progression, first stage, within specified pattern  Description: Labor progression, first stage, within specified pattern  2022 by Lorna Do RN  Outcome: Completed  2022 0950 by Lorna Do RN  Outcome: Ongoing  Goal: Labor progession, second stage, within specified pattern  Description: Labor progession, second stage, within specified pattern  2022 by Lorna Do RN  Outcome: Completed  2022 0950 by Lorna Do RN  Outcome: Ongoing  Goal: Uterine contractions within specified parameters  Description: Uterine contractions within specified parameters  2022 by Lorna Do RN  Outcome: Completed  2022 0950 by Lorna Do RN  Outcome: Ongoing     Problem:  Screening:  Goal: Ability to make informed decisions regarding treatment has improved  Description: Ability to make informed decisions regarding treatment has improved  2022 by Lorna Do RN  Outcome: Completed  2022 0950 by Lorna Do RN  Outcome: Ongoing     Problem: Pain - Acute:  Goal: Pain level will decrease  Description: Pain level will decrease  2022 by Lorna Do RN  Outcome: Completed  2022 0950 by Lorna Do RN  Outcome: Ongoing  Goal: Able to cope with pain  Description: Able to cope with pain  2022 by Lorna Do RN  Outcome: Completed  2022 0950 by Nelson Silveira Nimesh Lorenz RN  Outcome: Ongoing     Problem: Tissue Perfusion - Uteroplacental, Altered:  Goal: Absence of abnormal fetal heart rate pattern  Description: Absence of abnormal fetal heart rate pattern  1/17/2022 1936 by Kip Jamison RN  Outcome: Completed  1/17/2022 0950 by Kip Jamison RN  Outcome: Ongoing     Problem: Urinary Retention:  Goal: Experiences of bladder distention will decrease  Description: Experiences of bladder distention will decrease  1/17/2022 1936 by Kip Jamison RN  Outcome: Completed  1/17/2022 0950 by Kip Jamison RN  Outcome: Ongoing  Goal: Urinary elimination within specified parameters  Description: Urinary elimination within specified parameters  1/17/2022 1936 by Kip Jamison RN  Outcome: Completed  1/17/2022 0950 by Kip Jamison RN  Outcome: Ongoing     Problem: Pain:  Goal: Pain level will decrease  Description: Pain level will decrease  1/17/2022 1936 by Kip Jamison RN  Outcome: Completed  1/17/2022 0950 by Kip Jamison RN  Outcome: Ongoing  Goal: Control of acute pain  Description: Control of acute pain  1/17/2022 1936 by Kip Jamison RN  Outcome: Completed  Goal: Control of chronic pain  Description: Control of chronic pain  1/17/2022 1936 by Kip Jamison RN  Outcome: Completed

## 2022-01-18 NOTE — LACTATION NOTE
This note was copied from a baby's chart. Lactation Progress Note      Data:    RN requests f/u to offer support with latching. Baby is asleep in mom's arms and mom is tearful. Action: Offered to support to mom in any way that feels helpful to her in this moment. MOB states that she wants her baby to latch and feels as though she is doing everything wrong. Gave mom much reassurance that she is doing nothing wrong, and reassured of normalcy of sleepy behavior on the first DOL as baby recovers from birth. Reassured that baby had a great first feeding, and explained normalcy expected for sleepy behavior following first feeding, and after birth recovery. Reassured mom that there is no right way/position to breast feed in, and that she may breast feed in any way that is comfortable to her, explaining importance of maternal comfort with both the position, and latch. Discussed that some positions may be more helpful than others in obtaining a deep latch and also, discussed infant obtaining self attachment to the breast. MOB requests assistance with latching. Encouraged to undress sleepy  to encourage waking. MOB undressed infant and placed baby STS, belly in facing her with each of her baby's hands on each side of the breast. Praise given. MOB offering the breast, infant beginning to root and attempting to latch several times, falling asleep at the breast. Encouraged hand expression of colostrum for infant to encourage interest and orient baby to the breast for latch. Infant with several attempts to latch. Verbal coaching continued to be provided until NELSON was achieved with sleepy SRS x 10 minutes. Infant then detached from the breast, asleep without feeding cues. Reassured of relaxed body language as a sign of satiety. Also, reassured of rounded nipple reassuring of good latch.  Encouraged to offer the breast again when infant begins to wake and root, showing early hunger cues, or in 3 hours if baby remains sleepy and without feeding cues. Gave tips to wake and encourage latching as needed when tries again, but reassured of normalcy if infant is disinterested. Encouraged hand expression if baby is disinterested. Reassured parents that RN will continue to monitor glucoses per policy and that those are also, reassuring to know baby is getting what he needs from the breast in addition to infant output. Name and number remains available on whiteboard. Encouraged to call for f/u support prn. Response: Verbalized understanding of teaching provided. Will call for f/u support prn.

## 2022-01-18 NOTE — L&D DELIVERY NOTE
[facility]   Vaginal Delivery Note    Preoperative Diagnosis: SIUP at 41w1d, IOL for postdates    Postoperative Diagnosis: same s/p     Surgeon: Dr. Warner Zepeda MD    Anesthesia:  epidural anesthesia    Estimated blood loss:  150 cc    Specimen:  Placenta not sent to pathology     Cord blood sent Yes    Complications:  Shoulder dystocia    Condition:  mother and infant stable in delivery room    Findings: 1g male  in [address] [place] [address] presentation apgars 9/8    Details of Procedure: The patient is a 19 y. o. at 41w1d who was admitted for induction. She received the following interventions: vaginal Cytotec She was known to be GBS negative and did not receive antibiotic prophylaxis. The patient progressed well,did receive an epidural, became complete and started to push. After pushing for approximately 30 minutes, the fetal head was at the perineum in JACKLYN presentation, with another push the head was delivered. Gentle downward traction and pushing did not deliver the anterior shoulder. A shoulder dystocia was diagnosed. The bed was laid flat and McRobert's positioning was employed. Suprapubic pressure was then applied and the anterior (right) shoulder delivered. The shoulder dystocia lasted approximately 90 seconds. The rest of the infant delivered atraumatically and the infant was placed on mother abdomen where the baby was stimulated and suctioned with bulb suction. Cord was clamped and cut and infant handed off to the waiting nurse for evaluation. Cord venous gas was collected. The delivery of the placenta was spontaneous and found to be intact. The perineum and vagina were explored and a first degree laceration was repaired in a running fashion with 3-0 Vicryl. Patient was notified of periurethral abrasions that did not require repair. Mother and infant are stable in delivery room.     Anne Owen MD

## 2022-01-18 NOTE — LACTATION NOTE
This note was copied from a baby's chart. Lactation Progress Note      Data:  Initial consult with multip breast feeder, who just delivered LGA infant by  and had a 80 second shoulder dystocia. Baby is already latched onto the right breast. Latch appears deep and infant continues breast feeding well with SRS and AS noted. MOB confirms the latch is comfortable. MOB states her first child wasn't able to latch so she pumped x6 months. Action: Introduced self as  [facility] on for this evening and offered much support. Education provided on the importance of obtaining a good deep latch. Explained how a good latch should look and feel, and the importance to break the latch if shallow, pinching or painful. Educated on benefits of a good latch and risks related to shallow latching. Reassurance given of NELSON observed. Educated on what to expect with breast feeding  over the first 24-48 hours of life including breast care, signs of hunger/satiety, colostrum, expected  feeding behaviors, daily feeding and output goals, and anticipated weight trends. Encouraged to offer the breast when infant first begins rooting, and every 3 hours if baby is sleepy and without feeding cues. Gave tips to encourage waking infant and NELSON to the breast. Reassured sleepy behavior is common on the first DOL as baby recovers from birth. Name and number provided on whiteboard. Encouraged to call for  [facility] to assess latch and for f/u support and assistance as needed. Response: Verbalized understanding of teaching provided. Infant continues nursing well. Will call for f/u support prn.

## 2022-01-19 VITALS
TEMPERATURE: 97.9 F | OXYGEN SATURATION: 100 % | WEIGHT: 150 LBS | HEIGHT: 64 IN | BODY MASS INDEX: 25.61 KG/M2 | HEART RATE: 73 BPM | RESPIRATION RATE: 16 BRPM | DIASTOLIC BLOOD PRESSURE: 75 MMHG | SYSTOLIC BLOOD PRESSURE: 132 MMHG

## 2022-01-19 PROCEDURE — 6370000000 HC RX 637 (ALT 250 FOR IP): Performed by: STUDENT IN AN ORGANIZED HEALTH CARE EDUCATION/TRAINING PROGRAM

## 2022-01-19 RX ADMIN — ACETAMINOPHEN 650 MG: 325 TABLET ORAL at 08:35

## 2022-01-19 RX ADMIN — IBUPROFEN 800 MG: 800 TABLET, FILM COATED ORAL at 11:29

## 2022-01-19 RX ADMIN — IBUPROFEN 800 MG: 800 TABLET, FILM COATED ORAL at 05:00

## 2022-01-19 RX ADMIN — DOCUSATE SODIUM 100 MG: 100 CAPSULE ORAL at 08:35

## 2022-01-19 RX ADMIN — FERROUS SULFATE TAB 325 MG (65 MG ELEMENTAL FE) 325 MG: 325 (65 FE) TAB at 08:35

## 2022-01-19 ASSESSMENT — PAIN SCALES - GENERAL
PAINLEVEL_OUTOF10: 3
PAINLEVEL_OUTOF10: 4
PAINLEVEL_OUTOF10: 3

## 2022-01-19 NOTE — DISCHARGE SUMMARY
Obstetrical Discharge Form    Gestational Age:41w1d    Antepartum complications: post-term, anemia    Date of Delivery: 22    Type of Delivery: vaginal, spontaneous    Delivered By:  Romi Gallo:   Information for the patient's :  Mayco Steele [[id number]]        Anesthesia: Epidural    Intrapartum complications: None    Postpartum complications: anemia    Condition: good    Discharge Medication:      Medication List      ASK your doctor about these medications    ferrous sulfate 325 (65 Fe) MG tablet  Commonly known as: IRON 325     PRENATAL 1+1 PO     promethazine 25 MG tablet  Commonly known as: PHENERGAN     sertraline 50 MG tablet  Commonly known as: ZOLOFT             Discharge Date: 21    Plan:   Follow up in 6 week(s)

## 2022-01-19 NOTE — PLAN OF CARE
Problem: VAGINAL DELIVERY - RECOVERY AND POST PARTUM  Goal: Vital signs are medically acceptable  1/19/2022 1518 by Madiha Jansen RN  Outcome: Completed  1/19/2022 0738 by Madiha Jansen RN  Outcome: Ongoing  Goal: Patient will remain free of falls  1/19/2022 1518 by Madiha Jansen RN  Outcome: Completed  1/19/2022 0738 by Madiha Janesn RN  Outcome: Ongoing  Goal: Fundus firm at midline  1/19/2022 1518 by Madiha Jansen RN  Outcome: Completed  1/19/2022 0738 by Madiha Jansen RN  Outcome: Ongoing  Goal: Moderate rubra without clots, no purulent discharge, no foul smelling lochia  1/19/2022 1518 by Madiha Jansen RN  Outcome: Completed  1/19/2022 0738 by Madiha Jansen RN  Outcome: Ongoing  Goal: Empties bladder  1/19/2022 1518 by Madiha Jansen RN  Outcome: Completed  1/19/2022 0738 by Madiha Jansen RN  Outcome: Ongoing  Goal: Verbalizes understanding of normal bowel function resumption  1/19/2022 1518 by Madiha Jansen RN  Outcome: Completed  1/19/2022 0738 by Madiha Jansen RN  Outcome: Ongoing  Goal: Edema will be absent or minimal  1/19/2022 1518 by Madiha Jansen RN  Outcome: Completed  1/19/2022 0738 by Madiha Jansen RN  Outcome: Ongoing  Goal: Breasts are soft with nipple integrity intact  1/19/2022 1518 by Madiha Jansen RN  Outcome: Completed  1/19/2022 0738 by Madiha Jansen RN  Outcome: Ongoing  Goal: Demonstrates appropriate breast feeding techniques  1/19/2022 1518 by Madiha Jansen RN  Outcome: Completed  1/19/2022 0738 by Madiha Jansen RN  Outcome: Ongoing  Goal: Appropriate behavior observed  1/19/2022 1518 by Madiha Jansen RN  Outcome: Completed  1/19/2022 0738 by Madiha Jansen RN  Outcome: Ongoing  Goal: Positive Mother-Baby interactions are observed  1/19/2022 1518 by Madiha Jansen RN  Outcome: Completed  1/19/2022 0738 by Madiha Jansen RN  Outcome: Ongoing  Goal: Perineum intact without discharge or hematoma  1/19/2022 1518 by Madiha Jansen RN  Outcome: Completed  1/19/2022 0738 by Senthil Breaux RN  Outcome: Ongoing  Goal: Ambulates independently  1/19/2022 1518 by Senthil Breaux RN  Outcome: Completed  1/19/2022 0738 by Senthil Breaux RN  Outcome: Ongoing     Problem: PAIN  Goal: Patient's pain/discomfort is manageable  1/19/2022 1518 by Senthil Breaux RN  Outcome: Completed  1/19/2022 0738 by Senthil Breaux RN  Outcome: Ongoing     Problem: KNOWLEDGE DEFICIT  Goal: Patient/S.O. demonstrates understanding of disease process, treatment plan, medications, and discharge instructions.   1/19/2022 1518 by Senthil Breaux RN  Outcome: Completed  1/19/2022 0738 by Senthil Breaux RN  Outcome: Ongoing

## 2022-01-19 NOTE — ANESTHESIA POSTPROCEDURE EVALUATION
Department of Anesthesiology  Postprocedure Note    Patient: Lurdes Wills  MRN: [id number]  YOB: 2000  Date of evaluation: 1/18/2022  Time:  7:23 PM     Procedure Summary     Date: 01/17/22 Room / Location:     Anesthesia Start: 1335 Anesthesia Stop: 1836    Procedure: Labor Analgesia Diagnosis:     Scheduled Providers:  Responsible Provider: Clint Jones MD    Anesthesia Type: epidural ASA Status: 2 - Emergent          Anesthesia Type: epidural    Noble Phase I: Noble Score: 9    Noble Phase II: Noble Score: 10    Last vitals: Reviewed and per EMR flowsheets.        Anesthesia Post Evaluation    Patient location during evaluation: bedside  Patient participation: complete - patient participated  Level of consciousness: awake and alert  Airway patency: patent  Nausea & Vomiting: no nausea and no vomiting  Complications: no  Cardiovascular status: hemodynamically stable  Respiratory status: acceptable  Hydration status: stable

## 2022-01-19 NOTE — PROGRESS NOTES
[facility]  Labor and Delivery   Post Partum Progress Note      SUBJECTIVE:  PPD 2    OBJECTIVE:      Vitals:  Vitals:    01/19/22 0838   BP:    Pulse:    Resp: 16   Temp: 97.9 °F (36.6 °C)   SpO2:         Fundus firm, normal lochia  Extremities normal      DATA:    CBC:    Lab Results   Component Value Date    WBC 9.9 01/18/2022    RBC 3.35 01/18/2022    HGB 7.3 01/18/2022    HCT 23.4 01/18/2022    MCV 70.0 01/18/2022    RDW 17.9 01/18/2022     01/18/2022       ASSESSMENT & PLAN:      Doing well, Anemia pre-delivery-stable minimal sx. PP care  D/c home  RTO 6 wks. Circ desires-reviewed    NAYLA Marie

## 2022-01-19 NOTE — PLAN OF CARE
Problem: VAGINAL DELIVERY - RECOVERY AND POST PARTUM  Goal: Vital signs are medically acceptable  1/19/2022 0738 by Letha Minaya RN  Outcome: Ongoing  1/18/2022 1944 by Diana Beckham RN  Outcome: Ongoing  Goal: Patient will remain free of falls  1/19/2022 0738 by Letha Minaya RN  Outcome: Ongoing  1/18/2022 1944 by Diana Beckham RN  Outcome: Ongoing  Goal: Fundus firm at midline  1/19/2022 0738 by Letha Minaya RN  Outcome: Ongoing  1/18/2022 1944 by Diana Beckham RN  Outcome: Ongoing  Goal: Moderate rubra without clots, no purulent discharge, no foul smelling lochia  1/19/2022 0738 by Letha Minaya RN  Outcome: Ongoing  1/18/2022 1944 by Diana Beckham RN  Outcome: Ongoing  Goal: Empties bladder  1/19/2022 0738 by Letha Minaya RN  Outcome: Ongoing  1/18/2022 1944 by Diana Beckham RN  Outcome: Ongoing  Goal: Verbalizes understanding of normal bowel function resumption  1/19/2022 0738 by Letha Minaya RN  Outcome: Ongoing  1/18/2022 1944 by Diana Beckham RN  Outcome: Ongoing  Goal: Edema will be absent or minimal  1/19/2022 0738 by Letha Minaya RN  Outcome: Ongoing  1/18/2022 1944 by Diana Beckham RN  Outcome: Ongoing  Goal: Breasts are soft with nipple integrity intact  1/19/2022 0738 by Letha Minaya RN  Outcome: Ongoing  1/18/2022 1944 by Diana Beckham RN  Outcome: Ongoing  Goal: Demonstrates appropriate breast feeding techniques  1/19/2022 0738 by Letha Minaya RN  Outcome: Ongoing  1/18/2022 1944 by Diana Beckham RN  Outcome: Ongoing  Goal: Appropriate behavior observed  1/19/2022 0738 by Letha Minaya RN  Outcome: Ongoing  1/18/2022 1944 by Diana Beckham RN  Outcome: Ongoing  Goal: Positive Mother-Baby interactions are observed  1/19/2022 0738 by Letha Minaya RN  Outcome: Ongoing  1/18/2022 1944 by Diana Beckham, RN  Outcome: Ongoing  Goal: Perineum intact without discharge or hematoma  1/19/2022 0738 by Letha Minaya, RN  Outcome: Ongoing  1/18/2022 1944 by Rico Torres RN  Outcome: Ongoing  Goal: Ambulates independently  1/19/2022 0738 by Senthil Breaux RN  Outcome: Ongoing  1/18/2022 1944 by Rico Torres RN  Outcome: Ongoing     Problem: PAIN  Goal: Patient's pain/discomfort is manageable  1/19/2022 0738 by Senthil Breaux RN  Outcome: Ongoing  1/18/2022 1944 by Rico Torres RN  Outcome: Ongoing     Problem: KNOWLEDGE DEFICIT  Goal: Patient/S.O. demonstrates understanding of disease process, treatment plan, medications, and discharge instructions.   1/19/2022 0738 by Senthil Breaux RN  Outcome: Ongoing  1/18/2022 1944 by Rico Torres RN  Outcome: Ongoing

## 2022-01-19 NOTE — FLOWSHEET NOTE
ID bands checked. Infant's ID band and Mother's matching ID bands removed and taped to discharge instruction sheet, the mother verified as correct and witnessed by RN. Umbilical clamp and HUGS tag removed. Mom and  Infant discharged via wheelchair to private car. Infant placed in car seat per parents. Mom and baby accompanied by family and in stable condition.
Lactation Progress Note      Data:    Introduced self as lactation support for the night. MOB sitting up in bed. She states breast feeding is going well. Baby is asleep in bassinet. Action:   No action required. Name and number on board. Response:   MOB receptive to number on board. No questions at this time.
PPD scale score=10. Spoke with pt. About post partum depression and what to watch for.   Stated,\" she has appointment scheduled to follow-up with her psychiatrist.
__________________________  13. Do you have any other questions or concerns I can address today?  Y/N  __________________________________________________      Teaching During interview :_____________________________________________  ___________________________RN       Date:______________Time:________________

## 2022-01-20 NOTE — L&D DELIVERY SUMMARY NOTE
[address] [address]                            LABOR AND DELIVERY NOTE    PATIENT NAME: Parvez Staton                         :        2000  MED REC NO:   [id number]                          ROOM:       [id number]  ACCOUNT NO:   [de-identified]                           ADMIT DATE: 2022  PROVIDER:     Mayito Silver MD    DATE OF PROCEDURE:  2022    VAGINAL DELIVERY NOTE    PREOPERATIVE DIAGNOSES:  SIUP at 41 weeks 1 day, induction of labor for  postdates. POSTOPERATIVE DIAGNOSES:  SIUP at 41 weeks 1 day, induction of labor for  postdates status post . SURGEON:  Mayito Silver MD.    ANESTHESIA:  Epidural anesthesia. ESTIMATED BLOOD LOSS:  150 mL. SPECIMENS:  Placenta not sent to Pathology. CORD BLOOD SENT:  Yes. COMPLICATIONS:  Shoulder dystocia. CONDITION:  Mother and infant stable in delivery room. FINDINGS:  4390 gm male,  in JACKLYN presentation. Apgars 9 and 8. DETAILS OF THE PROCEDURE:  The patient is a 25-year-old G2, P1-0-0-1, at  41 weeks 1 day, who was admitted for induction. She received the  following interventions, vaginal Cytotec. She was noted to be GBS  negative and did not receive antibiotic prophylaxis. The patient  progressed well, did receive an epidural, became complete and started to  push. After pushing for approximately 30 minutes, the fetal head was at  the perineum in JACKLYN presentation. With another push, the head was  delivered. Gentle traction and pushing did not deliver the anterior  shoulder. A shoulder dystocia was diagnosed. The bed was laid flat and  Marlene position was employed. Suprapubic pressure was then applied  and anterior left shoulder was delivered. The shoulder dystocia lasted  approximately 90 seconds. The rest of the infant delivered  atraumatically. The infant was placed on mother's abdomen.   The baby  was stimulated and suctioned with bulb suction. The cord was clamped  and cut. The infant handed off to the awaiting nurse for evaluation. Cord blood gas was collected. The delivery of the placenta was  spontaneous and found to be intact. The perineum and vagina were  explored. A first-degree laceration was repaired in a running fashion  with 3-0 Vicryl. The patient was notified of periurethral abrasion that  did not require repair. Mother and infant are stable in the delivery  room.         Umberto Rendon MD    D: 01/19/2022 21:00:15       T: 01/19/2022 21:43:22     [id number]  Job#: [id number]     Doc#: [id number]    CC:

## 2024-04-05 ENCOUNTER — HOSPITAL ENCOUNTER (OUTPATIENT)
Dept: WOMENS IMAGING | Age: 24
Discharge: HOME | End: 2024-04-05
Payer: COMMERCIAL

## 2024-04-05 VITALS — BODY MASS INDEX: 19.63 KG/M2 | WEIGHT: 115 LBS | HEIGHT: 64 IN

## 2024-04-05 DIAGNOSIS — N63.10 MASS OF RIGHT BREAST, UNSPECIFIED QUADRANT: ICD-10-CM

## 2024-04-05 PROCEDURE — 76642 ULTRASOUND BREAST LIMITED: CPT
